# Patient Record
Sex: FEMALE | Race: WHITE | NOT HISPANIC OR LATINO
[De-identification: names, ages, dates, MRNs, and addresses within clinical notes are randomized per-mention and may not be internally consistent; named-entity substitution may affect disease eponyms.]

---

## 2017-06-25 ENCOUNTER — CHARTING TRANS (OUTPATIENT)
Dept: OTHER | Age: 32
End: 2017-06-25

## 2017-06-25 ENCOUNTER — LAB SERVICES (OUTPATIENT)
Dept: OTHER | Age: 32
End: 2017-06-25

## 2017-06-25 LAB
APPEARANCE: NORMAL
BILIRUBIN: NORMAL
GLUCOSE U: NORMAL
KETONES: NORMAL
LEUKOCYTES: NORMAL
NITRITE: NORMAL
OCCULT BLOOD: NORMAL
PH: 6
PROTEIN: NORMAL
URINE SPEC GRAVITY: 1015
UROBILINOGEN: 0.2

## 2017-07-02 LAB — BACTERIA UR CULT: NORMAL

## 2017-10-17 PROCEDURE — 82947 ASSAY GLUCOSE BLOOD QUANT: CPT | Performed by: INTERNAL MEDICINE

## 2017-10-17 PROCEDURE — 87491 CHLMYD TRACH DNA AMP PROBE: CPT | Performed by: INTERNAL MEDICINE

## 2017-10-17 PROCEDURE — 80074 ACUTE HEPATITIS PANEL: CPT | Performed by: INTERNAL MEDICINE

## 2017-10-17 PROCEDURE — 87591 N.GONORRHOEAE DNA AMP PROB: CPT | Performed by: INTERNAL MEDICINE

## 2017-10-17 PROCEDURE — 87389 HIV-1 AG W/HIV-1&-2 AB AG IA: CPT | Performed by: INTERNAL MEDICINE

## 2017-10-17 PROCEDURE — 86780 TREPONEMA PALLIDUM: CPT | Performed by: INTERNAL MEDICINE

## 2018-04-26 ENCOUNTER — HOSPITAL ENCOUNTER (OUTPATIENT)
Dept: MAMMOGRAPHY | Age: 33
Discharge: HOME | End: 2018-04-26
Attending: SPECIALIST
Payer: COMMERCIAL

## 2018-04-26 DIAGNOSIS — Z12.31 ENCOUNTER FOR SCREENING MAMMOGRAM FOR MALIGNANT NEOPLASM OF BREAST: ICD-10-CM

## 2018-04-26 PROCEDURE — 77067 SCR MAMMO BI INCL CAD: CPT | Performed by: SPECIALIST

## 2018-08-31 ENCOUNTER — CHARTING TRANS (OUTPATIENT)
Dept: OTHER | Age: 33
End: 2018-08-31

## 2018-08-31 ENCOUNTER — LAB SERVICES (OUTPATIENT)
Dept: OTHER | Age: 33
End: 2018-08-31

## 2018-09-02 LAB — BACTERIA UR CULT: NORMAL

## 2018-11-03 VITALS
WEIGHT: 118 LBS | HEIGHT: 64 IN | DIASTOLIC BLOOD PRESSURE: 76 MMHG | HEART RATE: 80 BPM | TEMPERATURE: 98.3 F | SYSTOLIC BLOOD PRESSURE: 100 MMHG | BODY MASS INDEX: 20.14 KG/M2 | RESPIRATION RATE: 16 BRPM

## 2018-11-15 PROBLEM — F32.0 CURRENT MILD EPISODE OF MAJOR DEPRESSIVE DISORDER WITHOUT PRIOR EPISODE (HCC): Status: ACTIVE | Noted: 2018-11-15

## 2018-11-15 PROBLEM — F32.0 CURRENT MILD EPISODE OF MAJOR DEPRESSIVE DISORDER WITHOUT PRIOR EPISODE: Status: ACTIVE | Noted: 2018-11-15

## 2018-11-27 VITALS — BODY MASS INDEX: 21.46 KG/M2 | RESPIRATION RATE: 14 BRPM | HEART RATE: 72 BPM | WEIGHT: 125 LBS

## 2019-02-12 PROCEDURE — 87086 URINE CULTURE/COLONY COUNT: CPT | Performed by: INTERNAL MEDICINE

## 2021-03-10 ENCOUNTER — TELEPHONE (OUTPATIENT)
Dept: OBGYN CLINIC | Facility: CLINIC | Age: 36
End: 2021-03-10

## 2021-03-10 DIAGNOSIS — O36.80X0 PREGNANCY WITH UNCERTAIN FETAL VIABILITY, SINGLE OR UNSPECIFIED FETUS: Primary | ICD-10-CM

## 2021-03-10 NOTE — TELEPHONE ENCOUNTER
----- Message from Rockwell Kocher sent at 3/10/2021  5:19 PM CST -----  Regarding: FOB US order  NP FOB US March 22, need order

## 2021-03-22 ENCOUNTER — INITIAL PRENATAL (OUTPATIENT)
Dept: OBGYN CLINIC | Facility: CLINIC | Age: 36
End: 2021-03-22
Payer: COMMERCIAL

## 2021-03-22 ENCOUNTER — ULTRASOUND ENCOUNTER (OUTPATIENT)
Dept: OBGYN CLINIC | Facility: CLINIC | Age: 36
End: 2021-03-22
Payer: COMMERCIAL

## 2021-03-22 VITALS
HEART RATE: 72 BPM | HEIGHT: 63 IN | SYSTOLIC BLOOD PRESSURE: 128 MMHG | WEIGHT: 123 LBS | DIASTOLIC BLOOD PRESSURE: 74 MMHG | BODY MASS INDEX: 21.79 KG/M2

## 2021-03-22 DIAGNOSIS — O09.519 SUPERVISION OF HIGH-RISK PREGNANCY OF ELDERLY PRIMIGRAVIDA: Primary | ICD-10-CM

## 2021-03-22 DIAGNOSIS — Z36.9 ENCOUNTER FOR ANTENATAL SCREENING OF MOTHER: ICD-10-CM

## 2021-03-22 PROCEDURE — 81002 URINALYSIS NONAUTO W/O SCOPE: CPT | Performed by: OBSTETRICS & GYNECOLOGY

## 2021-03-22 PROCEDURE — 3078F DIAST BP <80 MM HG: CPT | Performed by: OBSTETRICS & GYNECOLOGY

## 2021-03-22 PROCEDURE — 3008F BODY MASS INDEX DOCD: CPT | Performed by: OBSTETRICS & GYNECOLOGY

## 2021-03-22 PROCEDURE — 3074F SYST BP LT 130 MM HG: CPT | Performed by: OBSTETRICS & GYNECOLOGY

## 2021-03-22 PROCEDURE — 87086 URINE CULTURE/COLONY COUNT: CPT | Performed by: OBSTETRICS & GYNECOLOGY

## 2021-03-22 PROCEDURE — 87491 CHLMYD TRACH DNA AMP PROBE: CPT | Performed by: OBSTETRICS & GYNECOLOGY

## 2021-03-22 PROCEDURE — 87591 N.GONORRHOEAE DNA AMP PROB: CPT | Performed by: OBSTETRICS & GYNECOLOGY

## 2021-03-22 RX ORDER — CHOLECALCIFEROL (VITAMIN D3) 25 MCG
1 TABLET,CHEWABLE ORAL DAILY
COMMUNITY

## 2021-03-22 NOTE — PROGRESS NOTES
Patient is here for her first OB appointment. Ultrasound today viable pregnancy and consistent with last menstrual.  Of January 25, 2021 giving a due date of 11/1/2021 at 8 weeks. Ultrasound today showed 8 weeks 2 days ROD of 10/30/2021.   Patient state

## 2021-03-23 LAB
C TRACH DNA SPEC QL NAA+PROBE: NEGATIVE
N GONORRHOEA DNA SPEC QL NAA+PROBE: NEGATIVE

## 2021-03-24 LAB
APPEARANCE: CLEAR
MULTISTIX LOT#: 1044 NUMERIC
PH, URINE: 7 (ref 4.5–8)
SPECIFIC GRAVITY: 1.01 (ref 1–1.03)
URINE-COLOR: YELLOW
UROBILINOGEN,SEMI-QN: 0.2 MG/DL (ref 0–1.9)

## 2021-03-30 ENCOUNTER — LAB ENCOUNTER (OUTPATIENT)
Dept: LAB | Facility: HOSPITAL | Age: 36
End: 2021-03-30
Attending: OBSTETRICS & GYNECOLOGY
Payer: COMMERCIAL

## 2021-03-30 DIAGNOSIS — Z36.9 ENCOUNTER FOR ANTENATAL SCREENING OF MOTHER: ICD-10-CM

## 2021-03-30 PROCEDURE — 86850 RBC ANTIBODY SCREEN: CPT

## 2021-03-30 PROCEDURE — 86901 BLOOD TYPING SEROLOGIC RH(D): CPT

## 2021-03-30 PROCEDURE — 87340 HEPATITIS B SURFACE AG IA: CPT

## 2021-03-30 PROCEDURE — 86900 BLOOD TYPING SEROLOGIC ABO: CPT

## 2021-03-30 PROCEDURE — 87389 HIV-1 AG W/HIV-1&-2 AB AG IA: CPT

## 2021-03-30 PROCEDURE — 86780 TREPONEMA PALLIDUM: CPT

## 2021-03-30 PROCEDURE — 86762 RUBELLA ANTIBODY: CPT

## 2021-03-30 PROCEDURE — 85025 COMPLETE CBC W/AUTO DIFF WBC: CPT

## 2021-03-30 PROCEDURE — 36415 COLL VENOUS BLD VENIPUNCTURE: CPT

## 2021-04-19 ENCOUNTER — ROUTINE PRENATAL (OUTPATIENT)
Dept: OBGYN CLINIC | Facility: CLINIC | Age: 36
End: 2021-04-19
Payer: COMMERCIAL

## 2021-04-19 VITALS
SYSTOLIC BLOOD PRESSURE: 104 MMHG | BODY MASS INDEX: 22.32 KG/M2 | DIASTOLIC BLOOD PRESSURE: 62 MMHG | HEIGHT: 63 IN | WEIGHT: 126 LBS

## 2021-04-19 DIAGNOSIS — Z34.01 ENCOUNTER FOR SUPERVISION OF NORMAL FIRST PREGNANCY IN FIRST TRIMESTER: Primary | ICD-10-CM

## 2021-04-19 PROCEDURE — 81002 URINALYSIS NONAUTO W/O SCOPE: CPT | Performed by: OBSTETRICS & GYNECOLOGY

## 2021-04-19 PROCEDURE — 3074F SYST BP LT 130 MM HG: CPT | Performed by: OBSTETRICS & GYNECOLOGY

## 2021-04-19 PROCEDURE — 3008F BODY MASS INDEX DOCD: CPT | Performed by: OBSTETRICS & GYNECOLOGY

## 2021-04-19 PROCEDURE — 3078F DIAST BP <80 MM HG: CPT | Performed by: OBSTETRICS & GYNECOLOGY

## 2021-04-27 ENCOUNTER — TELEPHONE (OUTPATIENT)
Dept: OBGYN CLINIC | Facility: CLINIC | Age: 36
End: 2021-04-27

## 2021-04-28 NOTE — TELEPHONE ENCOUNTER
Spoke with patient and she is aware of her [facility] NIPT results. Trisomy 13,18 and 21 are negative and she is aware of the predicted fetal sex. Results released. Understanding verbalized.

## 2021-05-04 ENCOUNTER — TELEPHONE (OUTPATIENT)
Dept: OBGYN CLINIC | Facility: CLINIC | Age: 36
End: 2021-05-04

## 2021-05-05 NOTE — TELEPHONE ENCOUNTER
Mark Horton MD  [facility] 5 minutes ago (11:46 AM)     It will be the patient's decision regarding partner testing    Message text

## 2021-05-05 NOTE — TELEPHONE ENCOUNTER
Patient advised of positive carrier screen. Understanding verbalized. Partner testing advised, pt states she is not sure who the baby's father is. Pt advised to review results and contact [facility] genetic counselors in the meantime.  will route to [facility] for fu

## 2021-05-05 NOTE — TELEPHONE ENCOUNTER
Patient states baby's father is being tested, he submitted request for saliva kit through [employer]. Patient instructed to provide our office with results once available. Patient verbalizes understanding.

## 2021-05-17 ENCOUNTER — ROUTINE PRENATAL (OUTPATIENT)
Dept: OBGYN CLINIC | Facility: CLINIC | Age: 36
End: 2021-05-17
Payer: COMMERCIAL

## 2021-05-17 VITALS
HEIGHT: 63 IN | DIASTOLIC BLOOD PRESSURE: 60 MMHG | BODY MASS INDEX: 22.86 KG/M2 | WEIGHT: 129 LBS | SYSTOLIC BLOOD PRESSURE: 90 MMHG

## 2021-05-17 DIAGNOSIS — Z36.89 ENCOUNTER FOR FETAL ANATOMIC SURVEY: ICD-10-CM

## 2021-05-17 DIAGNOSIS — Z34.02 ENCOUNTER FOR SUPERVISION OF NORMAL FIRST PREGNANCY IN SECOND TRIMESTER: Primary | ICD-10-CM

## 2021-05-17 PROCEDURE — 3074F SYST BP LT 130 MM HG: CPT | Performed by: OBSTETRICS & GYNECOLOGY

## 2021-05-17 PROCEDURE — 3078F DIAST BP <80 MM HG: CPT | Performed by: OBSTETRICS & GYNECOLOGY

## 2021-05-17 PROCEDURE — 3008F BODY MASS INDEX DOCD: CPT | Performed by: OBSTETRICS & GYNECOLOGY

## 2021-05-17 PROCEDURE — 81002 URINALYSIS NONAUTO W/O SCOPE: CPT | Performed by: OBSTETRICS & GYNECOLOGY

## 2021-05-17 NOTE — PROGRESS NOTES
Doing well. Has spell some movements. Declined alpha-fetoprotein for neural tube defect. OB ultrasound with next appointment.   Return to office in 4 weeks

## 2021-06-14 ENCOUNTER — ULTRASOUND ENCOUNTER (OUTPATIENT)
Dept: OBGYN CLINIC | Facility: CLINIC | Age: 36
End: 2021-06-14
Payer: COMMERCIAL

## 2021-06-14 ENCOUNTER — ROUTINE PRENATAL (OUTPATIENT)
Dept: OBGYN CLINIC | Facility: CLINIC | Age: 36
End: 2021-06-14
Payer: COMMERCIAL

## 2021-06-14 VITALS
BODY MASS INDEX: 24.27 KG/M2 | HEIGHT: 63 IN | DIASTOLIC BLOOD PRESSURE: 60 MMHG | WEIGHT: 137 LBS | SYSTOLIC BLOOD PRESSURE: 100 MMHG | HEART RATE: 81 BPM

## 2021-06-14 DIAGNOSIS — Z34.02 ENCOUNTER FOR SUPERVISION OF NORMAL FIRST PREGNANCY IN SECOND TRIMESTER: ICD-10-CM

## 2021-06-14 DIAGNOSIS — O09.519 SUPERVISION OF HIGH-RISK PREGNANCY OF ELDERLY PRIMIGRAVIDA: Primary | ICD-10-CM

## 2021-06-14 PROCEDURE — 3008F BODY MASS INDEX DOCD: CPT | Performed by: OBSTETRICS & GYNECOLOGY

## 2021-06-14 PROCEDURE — 81002 URINALYSIS NONAUTO W/O SCOPE: CPT | Performed by: OBSTETRICS & GYNECOLOGY

## 2021-06-14 PROCEDURE — 3078F DIAST BP <80 MM HG: CPT | Performed by: OBSTETRICS & GYNECOLOGY

## 2021-06-14 PROCEDURE — 3074F SYST BP LT 130 MM HG: CPT | Performed by: OBSTETRICS & GYNECOLOGY

## 2021-06-14 RX ORDER — VALACYCLOVIR HYDROCHLORIDE 1 G/1
TABLET, FILM COATED ORAL
COMMUNITY
End: 2021-07-12

## 2021-07-12 ENCOUNTER — ROUTINE PRENATAL (OUTPATIENT)
Dept: OBGYN CLINIC | Facility: CLINIC | Age: 36
End: 2021-07-12
Payer: COMMERCIAL

## 2021-07-12 VITALS
HEART RATE: 80 BPM | SYSTOLIC BLOOD PRESSURE: 90 MMHG | BODY MASS INDEX: 24.98 KG/M2 | DIASTOLIC BLOOD PRESSURE: 62 MMHG | WEIGHT: 141 LBS | HEIGHT: 63 IN

## 2021-07-12 DIAGNOSIS — Z36.9 ENCOUNTER FOR ANTENATAL SCREENING OF MOTHER: ICD-10-CM

## 2021-07-12 DIAGNOSIS — Z3A.24 24 WEEKS GESTATION OF PREGNANCY: Primary | ICD-10-CM

## 2021-07-12 LAB
GLUCOSE (URINE DIPSTICK): NEGATIVE MG/DL
MULTISTIX LOT#: NORMAL NUMERIC
PROTEIN (URINE DIPSTICK): NEGATIVE MG/DL

## 2021-07-12 PROCEDURE — 3074F SYST BP LT 130 MM HG: CPT | Performed by: OBSTETRICS & GYNECOLOGY

## 2021-07-12 PROCEDURE — 3008F BODY MASS INDEX DOCD: CPT | Performed by: OBSTETRICS & GYNECOLOGY

## 2021-07-12 PROCEDURE — 81002 URINALYSIS NONAUTO W/O SCOPE: CPT | Performed by: OBSTETRICS & GYNECOLOGY

## 2021-07-12 PROCEDURE — 3078F DIAST BP <80 MM HG: CPT | Performed by: OBSTETRICS & GYNECOLOGY

## 2021-07-19 ENCOUNTER — LAB ENCOUNTER (OUTPATIENT)
Dept: LAB | Age: 36
End: 2021-07-19
Attending: OBSTETRICS & GYNECOLOGY
Payer: COMMERCIAL

## 2021-07-19 DIAGNOSIS — Z36.9 ENCOUNTER FOR ANTENATAL SCREENING OF MOTHER: ICD-10-CM

## 2021-07-19 LAB
BASOPHILS # BLD AUTO: 0.03 X10(3) UL (ref 0–0.2)
BASOPHILS NFR BLD AUTO: 0.3 %
DEPRECATED RDW RBC AUTO: 45.5 FL (ref 35.1–46.3)
EOSINOPHIL # BLD AUTO: 0.33 X10(3) UL (ref 0–0.7)
EOSINOPHIL NFR BLD AUTO: 2.8 %
ERYTHROCYTE [DISTWIDTH] IN BLOOD BY AUTOMATED COUNT: 12.9 % (ref 11–15)
GLUCOSE 1H P GLC SERPL-MCNC: 118 MG/DL
HCT VFR BLD AUTO: 34.5 %
HGB BLD-MCNC: 11.1 G/DL
IMM GRANULOCYTES # BLD AUTO: 0.07 X10(3) UL (ref 0–1)
IMM GRANULOCYTES NFR BLD: 0.6 %
LYMPHOCYTES # BLD AUTO: 2.21 X10(3) UL (ref 1–4)
LYMPHOCYTES NFR BLD AUTO: 18.6 %
MCH RBC QN AUTO: 31.1 PG (ref 26–34)
MCHC RBC AUTO-ENTMCNC: 32.2 G/DL (ref 31–37)
MCV RBC AUTO: 96.6 FL
MONOCYTES # BLD AUTO: 0.84 X10(3) UL (ref 0.1–1)
MONOCYTES NFR BLD AUTO: 7.1 %
NEUTROPHILS # BLD AUTO: 8.41 X10 (3) UL (ref 1.5–7.7)
NEUTROPHILS # BLD AUTO: 8.41 X10(3) UL (ref 1.5–7.7)
NEUTROPHILS NFR BLD AUTO: 70.6 %
PLATELET # BLD AUTO: 309 10(3)UL (ref 150–450)
RBC # BLD AUTO: 3.57 X10(6)UL
WBC # BLD AUTO: 11.9 X10(3) UL (ref 4–11)

## 2021-07-19 PROCEDURE — 36415 COLL VENOUS BLD VENIPUNCTURE: CPT

## 2021-07-19 PROCEDURE — 82950 GLUCOSE TEST: CPT

## 2021-07-19 PROCEDURE — 85025 COMPLETE CBC W/AUTO DIFF WBC: CPT

## 2021-08-07 ENCOUNTER — TELEPHONE (OUTPATIENT)
Dept: OBGYN CLINIC | Facility: CLINIC | Age: 36
End: 2021-08-07

## 2021-08-07 NOTE — TELEPHONE ENCOUNTER
Patient states she's urinating a lot and once done she has mild tingling and slight burning. Please advise.   She has upcoming appointment Monday the 9th

## 2021-08-07 NOTE — TELEPHONE ENCOUNTER
Patient is 27 weeks pregnant, reporting frequent urination with slight burning. Denies fever. Patient offered appointment today but declined. She is schedule to be seen on Monday in our office. Patient instructed to go to urgent care if symptoms worse.  Mes

## 2021-08-09 ENCOUNTER — ROUTINE PRENATAL (OUTPATIENT)
Dept: OBGYN CLINIC | Facility: CLINIC | Age: 36
End: 2021-08-09
Payer: COMMERCIAL

## 2021-08-09 VITALS
HEIGHT: 63 IN | DIASTOLIC BLOOD PRESSURE: 70 MMHG | BODY MASS INDEX: 26.05 KG/M2 | SYSTOLIC BLOOD PRESSURE: 110 MMHG | WEIGHT: 147 LBS | HEART RATE: 88 BPM

## 2021-08-09 DIAGNOSIS — Z3A.28 28 WEEKS GESTATION OF PREGNANCY: Primary | ICD-10-CM

## 2021-08-09 DIAGNOSIS — R30.0 DYSURIA: ICD-10-CM

## 2021-08-09 DIAGNOSIS — Z36.9 ENCOUNTER FOR ANTENATAL SCREENING OF MOTHER: ICD-10-CM

## 2021-08-09 LAB
APPEARANCE: CLEAR
BILIRUBIN: NEGATIVE
GLUCOSE (URINE DIPSTICK): NEGATIVE MG/DL
GLUCOSE (URINE DIPSTICK): NEGATIVE MG/DL
KETONES (URINE DIPSTICK): NEGATIVE MG/DL
LEUKOCYTES: NEGATIVE
MULTISTIX LOT#: 5077 NUMERIC
MULTISTIX LOT#: NORMAL NUMERIC
NITRITE, URINE: NEGATIVE
OCCULT BLOOD: NEGATIVE
PH, URINE: 7 (ref 4.5–8)
PROTEIN (URINE DIPSTICK): NEGATIVE MG/DL
PROTEIN (URINE DIPSTICK): NEGATIVE MG/DL
SPECIFIC GRAVITY: 1.01 (ref 1–1.03)
URINE-COLOR: YELLOW
UROBILINOGEN,SEMI-QN: 0.2 MG/DL (ref 0–1.9)

## 2021-08-09 PROCEDURE — 3008F BODY MASS INDEX DOCD: CPT | Performed by: OBSTETRICS & GYNECOLOGY

## 2021-08-09 PROCEDURE — 87086 URINE CULTURE/COLONY COUNT: CPT | Performed by: OBSTETRICS & GYNECOLOGY

## 2021-08-09 PROCEDURE — 99212 OFFICE O/P EST SF 10 MIN: CPT | Performed by: OBSTETRICS & GYNECOLOGY

## 2021-08-09 PROCEDURE — 81002 URINALYSIS NONAUTO W/O SCOPE: CPT | Performed by: OBSTETRICS & GYNECOLOGY

## 2021-08-09 PROCEDURE — 3078F DIAST BP <80 MM HG: CPT | Performed by: OBSTETRICS & GYNECOLOGY

## 2021-08-09 PROCEDURE — 3074F SYST BP LT 130 MM HG: CPT | Performed by: OBSTETRICS & GYNECOLOGY

## 2021-08-09 RX ORDER — NITROFURANTOIN 25; 75 MG/1; MG/1
100 CAPSULE ORAL 2 TIMES DAILY
Qty: 14 CAPSULE | Refills: 0 | Status: SHIPPED | OUTPATIENT
Start: 2021-08-09 | End: 2021-08-16

## 2021-08-09 NOTE — PROGRESS NOTES
Fetus active. Taking prenatal vitamins. Having dysuria and frequency for 1 week  CHIEF COMPLAINT:   Patient presents with frequency and discomfort with urination and urgency for the past week.   HPI:   Nohemi Callejas is a 43year old  Patient's last mens

## 2021-08-12 NOTE — PROGRESS NOTES
Hi [facility],   Your urine culture results are normal. I encourage you to learn more about each test by accessing the great resources available through [address]  Click on the test name if you would like to see a description of the test. I hope this information

## 2021-08-23 ENCOUNTER — ROUTINE PRENATAL (OUTPATIENT)
Dept: OBGYN CLINIC | Facility: CLINIC | Age: 36
End: 2021-08-23
Payer: COMMERCIAL

## 2021-08-23 VITALS
WEIGHT: 150 LBS | HEART RATE: 80 BPM | HEIGHT: 63 IN | SYSTOLIC BLOOD PRESSURE: 100 MMHG | BODY MASS INDEX: 26.58 KG/M2 | DIASTOLIC BLOOD PRESSURE: 60 MMHG

## 2021-08-23 DIAGNOSIS — Z3A.29 29 WEEKS GESTATION OF PREGNANCY: Primary | ICD-10-CM

## 2021-08-23 PROCEDURE — 90471 IMMUNIZATION ADMIN: CPT | Performed by: OBSTETRICS & GYNECOLOGY

## 2021-08-23 PROCEDURE — 90715 TDAP VACCINE 7 YRS/> IM: CPT | Performed by: OBSTETRICS & GYNECOLOGY

## 2021-08-23 PROCEDURE — 3008F BODY MASS INDEX DOCD: CPT | Performed by: OBSTETRICS & GYNECOLOGY

## 2021-08-23 PROCEDURE — 3078F DIAST BP <80 MM HG: CPT | Performed by: OBSTETRICS & GYNECOLOGY

## 2021-08-23 PROCEDURE — 81002 URINALYSIS NONAUTO W/O SCOPE: CPT | Performed by: OBSTETRICS & GYNECOLOGY

## 2021-08-23 PROCEDURE — 3074F SYST BP LT 130 MM HG: CPT | Performed by: OBSTETRICS & GYNECOLOGY

## 2021-09-08 ENCOUNTER — ROUTINE PRENATAL (OUTPATIENT)
Dept: OBGYN CLINIC | Facility: CLINIC | Age: 36
End: 2021-09-08
Payer: COMMERCIAL

## 2021-09-08 VITALS
HEIGHT: 63 IN | DIASTOLIC BLOOD PRESSURE: 84 MMHG | WEIGHT: 156 LBS | HEART RATE: 74 BPM | BODY MASS INDEX: 27.64 KG/M2 | SYSTOLIC BLOOD PRESSURE: 127 MMHG

## 2021-09-08 DIAGNOSIS — Z3A.32 32 WEEKS GESTATION OF PREGNANCY: Primary | ICD-10-CM

## 2021-09-08 DIAGNOSIS — Z36.89 ENCOUNTER FOR ULTRASOUND TO ASSESS INTERVAL GROWTH OF FETUS: ICD-10-CM

## 2021-09-08 PROCEDURE — 81002 URINALYSIS NONAUTO W/O SCOPE: CPT | Performed by: OBSTETRICS & GYNECOLOGY

## 2021-09-08 PROCEDURE — 3079F DIAST BP 80-89 MM HG: CPT | Performed by: OBSTETRICS & GYNECOLOGY

## 2021-09-08 PROCEDURE — 3074F SYST BP LT 130 MM HG: CPT | Performed by: OBSTETRICS & GYNECOLOGY

## 2021-09-08 PROCEDURE — 3008F BODY MASS INDEX DOCD: CPT | Performed by: OBSTETRICS & GYNECOLOGY

## 2021-09-08 RX ORDER — FERROUS SULFATE 325(65) MG
TABLET ORAL
COMMUNITY
End: 2021-11-15

## 2021-09-08 NOTE — PROGRESS NOTES
Fetus active. Return to office in 2 weeks. Need to have disability paper filled out. Would like time off before delivery.   Return to office in 2 weeks

## 2021-09-17 ENCOUNTER — LABORATORY ENCOUNTER (OUTPATIENT)
Dept: LAB | Age: 36
End: 2021-09-17
Attending: OBSTETRICS & GYNECOLOGY
Payer: COMMERCIAL

## 2021-09-17 DIAGNOSIS — Z36.9 ENCOUNTER FOR ANTENATAL SCREENING OF MOTHER: ICD-10-CM

## 2021-09-17 DIAGNOSIS — R30.0 DYSURIA: ICD-10-CM

## 2021-09-17 LAB
BILIRUB UR QL STRIP.AUTO: NEGATIVE
COLOR UR AUTO: YELLOW
GLUCOSE UR STRIP.AUTO-MCNC: 150 MG/DL
KETONES UR STRIP.AUTO-MCNC: NEGATIVE MG/DL
LEUKOCYTE ESTERASE UR QL STRIP.AUTO: NEGATIVE
NITRITE UR QL STRIP.AUTO: NEGATIVE
PH UR STRIP.AUTO: 6 [PH] (ref 5–8)
PROT UR STRIP.AUTO-MCNC: NEGATIVE MG/DL
RBC UR QL AUTO: NEGATIVE
SP GR UR STRIP.AUTO: 1 (ref 1–1.03)
UROBILINOGEN UR STRIP.AUTO-MCNC: <2 MG/DL

## 2021-09-17 PROCEDURE — 36415 COLL VENOUS BLD VENIPUNCTURE: CPT

## 2021-09-17 PROCEDURE — 81003 URINALYSIS AUTO W/O SCOPE: CPT

## 2021-09-17 PROCEDURE — 87389 HIV-1 AG W/HIV-1&-2 AB AG IA: CPT

## 2021-09-20 ENCOUNTER — ULTRASOUND ENCOUNTER (OUTPATIENT)
Dept: OBGYN CLINIC | Facility: CLINIC | Age: 36
End: 2021-09-20
Payer: COMMERCIAL

## 2021-09-20 ENCOUNTER — ROUTINE PRENATAL (OUTPATIENT)
Dept: OBGYN CLINIC | Facility: CLINIC | Age: 36
End: 2021-09-20
Payer: COMMERCIAL

## 2021-09-20 VITALS
DIASTOLIC BLOOD PRESSURE: 70 MMHG | HEART RATE: 76 BPM | HEIGHT: 63 IN | WEIGHT: 157 LBS | BODY MASS INDEX: 27.82 KG/M2 | SYSTOLIC BLOOD PRESSURE: 104 MMHG

## 2021-09-20 DIAGNOSIS — O09.519 SUPERVISION OF HIGH-RISK PREGNANCY OF ELDERLY PRIMIGRAVIDA: Primary | ICD-10-CM

## 2021-09-20 DIAGNOSIS — Z23 NEED FOR VACCINATION: ICD-10-CM

## 2021-09-20 DIAGNOSIS — Z34.93 ENCOUNTER FOR SUPERVISION OF NORMAL PREGNANCY IN THIRD TRIMESTER, UNSPECIFIED GRAVIDITY: ICD-10-CM

## 2021-09-20 PROCEDURE — 3008F BODY MASS INDEX DOCD: CPT | Performed by: OBSTETRICS & GYNECOLOGY

## 2021-09-20 PROCEDURE — 81002 URINALYSIS NONAUTO W/O SCOPE: CPT | Performed by: OBSTETRICS & GYNECOLOGY

## 2021-09-20 PROCEDURE — 90471 IMMUNIZATION ADMIN: CPT | Performed by: OBSTETRICS & GYNECOLOGY

## 2021-09-20 PROCEDURE — 90686 IIV4 VACC NO PRSV 0.5 ML IM: CPT | Performed by: OBSTETRICS & GYNECOLOGY

## 2021-09-20 PROCEDURE — 3078F DIAST BP <80 MM HG: CPT | Performed by: OBSTETRICS & GYNECOLOGY

## 2021-09-20 PROCEDURE — 3074F SYST BP LT 130 MM HG: CPT | Performed by: OBSTETRICS & GYNECOLOGY

## 2021-09-20 NOTE — PROGRESS NOTES
Ultrasound done today showing normal growth. Flu shot given today. Fetus active. Will do NST with next appointment.  Return to office in 4 weeks, BRUNO

## 2021-09-22 ENCOUNTER — NURSE ONLY (OUTPATIENT)
Dept: LAB | Facility: HOSPITAL | Age: 36
End: 2021-09-22
Attending: OBSTETRICS & GYNECOLOGY
Payer: COMMERCIAL

## 2021-09-22 ENCOUNTER — TELEPHONE (OUTPATIENT)
Dept: OBGYN CLINIC | Facility: CLINIC | Age: 36
End: 2021-09-22

## 2021-09-22 DIAGNOSIS — R05.9 COUGH: ICD-10-CM

## 2021-09-22 DIAGNOSIS — R05.9 COUGH: Primary | ICD-10-CM

## 2021-09-22 NOTE — TELEPHONE ENCOUNTER
Spoke with patient, fever of 99.9 that started yesterday, started taking Tylenol, which is helping. Nasal congestion, runny nose, sore throat, cough. Denies shortness of breath. She is vaccinated, but worried about Covid and would like to be tested.  No pos

## 2021-09-23 ENCOUNTER — TELEPHONE (OUTPATIENT)
Dept: OBGYN CLINIC | Facility: CLINIC | Age: 36
End: 2021-09-23

## 2021-09-23 LAB — SARS-COV-2 RNA RESP QL NAA+PROBE: NOT DETECTED

## 2021-09-24 NOTE — TELEPHONE ENCOUNTER
Spoke with patient, gathered more information regarding FMLA paperwork. Paperwork completed and placed in provider box for signature. Patient verbalized understanding.

## 2021-09-29 ENCOUNTER — TELEPHONE (OUTPATIENT)
Dept: OBGYN CLINIC | Facility: CLINIC | Age: 36
End: 2021-09-29

## 2021-09-29 NOTE — TELEPHONE ENCOUNTER
Pt states Medical leave was denied due to a question. Pt would like to know if she should  bring in the forms to get revised or can she cross it out herself?

## 2021-10-01 NOTE — TELEPHONE ENCOUNTER
PT called but didn't want to leave a message and said she would try back for [facility]

## 2021-10-04 ENCOUNTER — HOSPITAL ENCOUNTER (OUTPATIENT)
Facility: HOSPITAL | Age: 36
Setting detail: OBSERVATION
Discharge: HOME | End: 2021-10-04
Attending: OBSTETRICS & GYNECOLOGY | Admitting: OBSTETRICS & GYNECOLOGY
Payer: COMMERCIAL

## 2021-10-04 ENCOUNTER — ROUTINE PRENATAL (OUTPATIENT)
Dept: OBGYN CLINIC | Facility: CLINIC | Age: 36
End: 2021-10-04
Payer: COMMERCIAL

## 2021-10-04 ENCOUNTER — APPOINTMENT (OUTPATIENT)
Dept: ULTRASOUND IMAGING | Facility: HOSPITAL | Age: 36
End: 2021-10-04
Attending: OBSTETRICS & GYNECOLOGY
Payer: COMMERCIAL

## 2021-10-04 VITALS
HEART RATE: 79 BPM | WEIGHT: 157 LBS | HEIGHT: 63 IN | SYSTOLIC BLOOD PRESSURE: 128 MMHG | DIASTOLIC BLOOD PRESSURE: 84 MMHG | BODY MASS INDEX: 27.82 KG/M2

## 2021-10-04 VITALS — SYSTOLIC BLOOD PRESSURE: 112 MMHG | DIASTOLIC BLOOD PRESSURE: 76 MMHG | HEART RATE: 75 BPM

## 2021-10-04 DIAGNOSIS — O36.8191 DECREASED FETAL MOVEMENT AFFECTING MANAGEMENT OF PREGNANCY, ANTEPARTUM, FETUS 1 OF MULTIPLE GESTATION: ICD-10-CM

## 2021-10-04 DIAGNOSIS — O09.519 SUPERVISION OF HIGH-RISK PREGNANCY OF ELDERLY PRIMIGRAVIDA: Primary | ICD-10-CM

## 2021-10-04 DIAGNOSIS — Z3A.36 36 WEEKS GESTATION OF PREGNANCY: ICD-10-CM

## 2021-10-04 PROBLEM — Z34.90 PREGNANCY: Status: ACTIVE | Noted: 2021-10-04

## 2021-10-04 PROBLEM — Z34.90 PREGNANCY (HCC): Status: ACTIVE | Noted: 2021-10-04

## 2021-10-04 PROCEDURE — 3079F DIAST BP 80-89 MM HG: CPT | Performed by: OBSTETRICS & GYNECOLOGY

## 2021-10-04 PROCEDURE — 76819 FETAL BIOPHYS PROFIL W/O NST: CPT | Performed by: OBSTETRICS & GYNECOLOGY

## 2021-10-04 PROCEDURE — 3074F SYST BP LT 130 MM HG: CPT | Performed by: OBSTETRICS & GYNECOLOGY

## 2021-10-04 PROCEDURE — 59025 FETAL NON-STRESS TEST: CPT | Performed by: OBSTETRICS & GYNECOLOGY

## 2021-10-04 PROCEDURE — 87653 STREP B DNA AMP PROBE: CPT | Performed by: OBSTETRICS & GYNECOLOGY

## 2021-10-04 PROCEDURE — 87081 CULTURE SCREEN ONLY: CPT | Performed by: OBSTETRICS & GYNECOLOGY

## 2021-10-04 PROCEDURE — 81002 URINALYSIS NONAUTO W/O SCOPE: CPT | Performed by: OBSTETRICS & GYNECOLOGY

## 2021-10-04 PROCEDURE — 3008F BODY MASS INDEX DOCD: CPT | Performed by: OBSTETRICS & GYNECOLOGY

## 2021-10-04 NOTE — PROGRESS NOTES
NST reactive. Decreased fetal movement. Patient states that over the past 1 week movements have been progressively decreasing. GBBS done today. To labor and delivery for another hour monitoring with biophysical profile kick count discussed.   Return to

## 2021-10-04 NOTE — PROGRESS NOTES
Pt admitted to triage rm 2 for decreased fm. Pt came over from [employer] office. Pt presents as G-1, P-0 w/edc 11/1/21. Pt accompanied by S/O. Pt denies srom, bleeding, pain or uc's. Abd soft,nontender. Pt states +fm noted since leaving OB office. Hx obtained.  PO

## 2021-10-05 NOTE — NST
Nonstress Test   Patient: Mac Ban    Gestation: 36w0d    NST:       Variability: Moderate           Accelerations: Yes           Decelerations: None            Baseline: 120 BPM                                                                Aco

## 2021-10-06 ENCOUNTER — ROUTINE PRENATAL (OUTPATIENT)
Dept: OBGYN CLINIC | Facility: CLINIC | Age: 36
End: 2021-10-06
Payer: COMMERCIAL

## 2021-10-06 VITALS
SYSTOLIC BLOOD PRESSURE: 126 MMHG | WEIGHT: 159.63 LBS | HEIGHT: 63 IN | BODY MASS INDEX: 28.29 KG/M2 | DIASTOLIC BLOOD PRESSURE: 78 MMHG

## 2021-10-06 DIAGNOSIS — O36.8190 DECREASED FETAL MOVEMENT AFFECTING MANAGEMENT OF PREGNANCY, ANTEPARTUM, SINGLE OR UNSPECIFIED FETUS: Primary | ICD-10-CM

## 2021-10-06 DIAGNOSIS — Z3A.36 36 WEEKS GESTATION OF PREGNANCY: ICD-10-CM

## 2021-10-06 PROCEDURE — 81002 URINALYSIS NONAUTO W/O SCOPE: CPT | Performed by: OBSTETRICS & GYNECOLOGY

## 2021-10-06 PROCEDURE — 3078F DIAST BP <80 MM HG: CPT | Performed by: OBSTETRICS & GYNECOLOGY

## 2021-10-06 PROCEDURE — 3074F SYST BP LT 130 MM HG: CPT | Performed by: OBSTETRICS & GYNECOLOGY

## 2021-10-06 PROCEDURE — 59025 FETAL NON-STRESS TEST: CPT | Performed by: OBSTETRICS & GYNECOLOGY

## 2021-10-06 PROCEDURE — 3008F BODY MASS INDEX DOCD: CPT | Performed by: OBSTETRICS & GYNECOLOGY

## 2021-10-06 NOTE — PROGRESS NOTES
Decrease FM 2 days ago with reactive NST and was sent to labor and delivery for prolonged monitoring and a biophysical profile. BPP was done with a score of 6 out of 8.   Patient was brought back for repeat NST which is very reassuring today with good acc

## 2021-10-11 ENCOUNTER — TELEPHONE (OUTPATIENT)
Dept: OBGYN CLINIC | Facility: CLINIC | Age: 36
End: 2021-10-11

## 2021-10-11 ENCOUNTER — ROUTINE PRENATAL (OUTPATIENT)
Dept: OBGYN CLINIC | Facility: CLINIC | Age: 36
End: 2021-10-11
Payer: COMMERCIAL

## 2021-10-11 VITALS
HEART RATE: 83 BPM | HEIGHT: 63 IN | BODY MASS INDEX: 28.53 KG/M2 | SYSTOLIC BLOOD PRESSURE: 127 MMHG | DIASTOLIC BLOOD PRESSURE: 83 MMHG | WEIGHT: 161 LBS

## 2021-10-11 DIAGNOSIS — Z3A.37 37 WEEKS GESTATION OF PREGNANCY: ICD-10-CM

## 2021-10-11 DIAGNOSIS — O09.523 AMA (ADVANCED MATERNAL AGE) MULTIGRAVIDA 35+, THIRD TRIMESTER: ICD-10-CM

## 2021-10-11 DIAGNOSIS — O09.519 SUPERVISION OF HIGH-RISK PREGNANCY OF ELDERLY PRIMIGRAVIDA: Primary | ICD-10-CM

## 2021-10-11 PROCEDURE — 3008F BODY MASS INDEX DOCD: CPT | Performed by: OBSTETRICS & GYNECOLOGY

## 2021-10-11 PROCEDURE — 3079F DIAST BP 80-89 MM HG: CPT | Performed by: OBSTETRICS & GYNECOLOGY

## 2021-10-11 PROCEDURE — 81002 URINALYSIS NONAUTO W/O SCOPE: CPT | Performed by: OBSTETRICS & GYNECOLOGY

## 2021-10-11 PROCEDURE — 3074F SYST BP LT 130 MM HG: CPT | Performed by: OBSTETRICS & GYNECOLOGY

## 2021-10-11 PROCEDURE — 59025 FETAL NON-STRESS TEST: CPT | Performed by: OBSTETRICS & GYNECOLOGY

## 2021-10-11 RX ORDER — PANTOPRAZOLE SODIUM 40 MG/1
40 TABLET, DELAYED RELEASE ORAL DAILY
Qty: 30 TABLET | Refills: 0 | Status: SHIPPED | OUTPATIENT
Start: 2021-10-11 | End: 2021-11-15

## 2021-10-11 NOTE — TELEPHONE ENCOUNTER
Spoke with patient, advised that she will go to the hospital on 10/30 at 8pm for Cytotec and be induced on 10/31/21. Patient verbalized understanding.

## 2021-10-11 NOTE — PROGRESS NOTES
Patient states that fetus is very active and movements are normal.  NST reactive today. Return to office in 1 week. Will do NST in labor and delivery next Monday. Having heartburn. Protonix 40 mg daily prescription called in.   No headaches or blurred

## 2021-10-11 NOTE — TELEPHONE ENCOUNTER
Spoke with L&D and scheduled patient for NST on 10/18/21 at 1430. Spoke with patient and notified her of date and time of NST. Dr. Kiley Salazar aware. Patient verbalized understanding.

## 2021-10-18 ENCOUNTER — HOSPITAL ENCOUNTER (OUTPATIENT)
Facility: HOSPITAL | Age: 36
Setting detail: OBSERVATION
Discharge: HOME | End: 2021-10-18
Attending: OBSTETRICS & GYNECOLOGY | Admitting: OBSTETRICS & GYNECOLOGY
Payer: COMMERCIAL

## 2021-10-18 ENCOUNTER — APPOINTMENT (OUTPATIENT)
Dept: OBGYN CLINIC | Facility: HOSPITAL | Age: 36
End: 2021-10-18
Payer: COMMERCIAL

## 2021-10-18 VITALS
TEMPERATURE: 97 F | RESPIRATION RATE: 20 BRPM | BODY MASS INDEX: 28.53 KG/M2 | SYSTOLIC BLOOD PRESSURE: 119 MMHG | HEART RATE: 89 BPM | HEIGHT: 63 IN | DIASTOLIC BLOOD PRESSURE: 87 MMHG | WEIGHT: 161 LBS

## 2021-10-18 PROCEDURE — 59025 FETAL NON-STRESS TEST: CPT | Performed by: OBSTETRICS & GYNECOLOGY

## 2021-10-18 NOTE — PROGRESS NOTES
Discharge instructions reviewed with pt. Pt verbalizes understanding. Denies questions. Pt leaves in stable condition per ambulation.

## 2021-10-18 NOTE — PROGRESS NOTES
, 38+0 weeks arrives per ambulation. Pt here for NST. PT taken to Triage 3 and changing at this time.

## 2021-10-18 NOTE — NST
Nonstress Test   Patient: Randy Infante    Gestation: 38w0d    NST:       Variability: Moderate           Accelerations: Yes           Decelerations: None            Baseline: 115 BPM           Uterine Irritability: No           Contractions: Regular

## 2021-10-18 NOTE — PROGRESS NOTES
EFMs applied, FHTs 115. Pt states she has been having mild ctxs. Denies LOF and vaginal bleeding. +FM. Pt denies any problems with pregnancy. Pt has anemia and depression. Pt states she stopped taking depression meds when she found out she was pregnant.  Pt None

## 2021-10-25 ENCOUNTER — ROUTINE PRENATAL (OUTPATIENT)
Dept: OBGYN CLINIC | Facility: CLINIC | Age: 36
End: 2021-10-25
Payer: COMMERCIAL

## 2021-10-25 VITALS
DIASTOLIC BLOOD PRESSURE: 85 MMHG | SYSTOLIC BLOOD PRESSURE: 124 MMHG | BODY MASS INDEX: 28.35 KG/M2 | HEIGHT: 63 IN | WEIGHT: 160 LBS | HEART RATE: 76 BPM

## 2021-10-25 DIAGNOSIS — Z34.03 ENCOUNTER FOR SUPERVISION OF NORMAL FIRST PREGNANCY IN THIRD TRIMESTER: ICD-10-CM

## 2021-10-25 DIAGNOSIS — O09.513 AMA (ADVANCED MATERNAL AGE) PRIMIGRAVIDA 35+, THIRD TRIMESTER: Primary | ICD-10-CM

## 2021-10-25 PROCEDURE — 3079F DIAST BP 80-89 MM HG: CPT | Performed by: OBSTETRICS & GYNECOLOGY

## 2021-10-25 PROCEDURE — 3074F SYST BP LT 130 MM HG: CPT | Performed by: OBSTETRICS & GYNECOLOGY

## 2021-10-25 PROCEDURE — 59025 FETAL NON-STRESS TEST: CPT | Performed by: OBSTETRICS & GYNECOLOGY

## 2021-10-25 PROCEDURE — 81002 URINALYSIS NONAUTO W/O SCOPE: CPT | Performed by: OBSTETRICS & GYNECOLOGY

## 2021-10-25 PROCEDURE — 3008F BODY MASS INDEX DOCD: CPT | Performed by: OBSTETRICS & GYNECOLOGY

## 2021-10-25 NOTE — PROGRESS NOTES
NST is reactive. No significant contractions. Cervix is closed 50% and -3. Scheduled for induction end of the week.   Return to office postpartum

## 2021-10-27 ENCOUNTER — TELEPHONE (OUTPATIENT)
Dept: OBGYN UNIT | Facility: HOSPITAL | Age: 36
End: 2021-10-27

## 2021-10-27 ENCOUNTER — LAB ENCOUNTER (OUTPATIENT)
Dept: LAB | Facility: HOSPITAL | Age: 36
End: 2021-10-27
Attending: OBSTETRICS & GYNECOLOGY
Payer: COMMERCIAL

## 2021-10-27 DIAGNOSIS — Z34.90 PREGNANCY: Primary | ICD-10-CM

## 2021-10-27 DIAGNOSIS — Z34.90 PREGNANCY: ICD-10-CM

## 2021-10-30 ENCOUNTER — HOSPITAL ENCOUNTER (INPATIENT)
Facility: HOSPITAL | Age: 36
LOS: 4 days | Discharge: HOME | End: 2021-11-03
Attending: OBSTETRICS & GYNECOLOGY | Admitting: OBSTETRICS & GYNECOLOGY
Payer: COMMERCIAL

## 2021-10-30 ENCOUNTER — APPOINTMENT (OUTPATIENT)
Dept: OBGYN CLINIC | Facility: HOSPITAL | Age: 36
End: 2021-10-30
Payer: COMMERCIAL

## 2021-10-30 PROCEDURE — 3E0P7VZ INTRODUCTION OF HORMONE INTO FEMALE REPRODUCTIVE, VIA NATURAL OR ARTIFICIAL OPENING: ICD-10-PCS | Performed by: OBSTETRICS & GYNECOLOGY

## 2021-10-30 RX ORDER — DEXTROSE, SODIUM CHLORIDE, SODIUM LACTATE, POTASSIUM CHLORIDE, AND CALCIUM CHLORIDE 5; .6; .31; .03; .02 G/100ML; G/100ML; G/100ML; G/100ML; G/100ML
INJECTION, SOLUTION INTRAVENOUS AS NEEDED
Status: DISCONTINUED | OUTPATIENT
Start: 2021-10-30 | End: 2021-10-31 | Stop reason: HOSPADM

## 2021-10-30 RX ORDER — IBUPROFEN 600 MG/1
600 TABLET ORAL EVERY 6 HOURS PRN
Status: DISCONTINUED | OUTPATIENT
Start: 2021-10-30 | End: 2021-10-31 | Stop reason: HOSPADM

## 2021-10-30 RX ORDER — TRISODIUM CITRATE DIHYDRATE AND CITRIC ACID MONOHYDRATE 500; 334 MG/5ML; MG/5ML
30 SOLUTION ORAL AS NEEDED
Status: COMPLETED | OUTPATIENT
Start: 2021-10-30 | End: 2021-10-31

## 2021-10-30 RX ORDER — ZOLPIDEM TARTRATE 5 MG/1
5 TABLET ORAL NIGHTLY PRN
Status: DISCONTINUED | OUTPATIENT
Start: 2021-10-30 | End: 2021-10-31

## 2021-10-30 RX ORDER — SODIUM CHLORIDE, SODIUM LACTATE, POTASSIUM CHLORIDE, CALCIUM CHLORIDE 600; 310; 30; 20 MG/100ML; MG/100ML; MG/100ML; MG/100ML
INJECTION, SOLUTION INTRAVENOUS CONTINUOUS
Status: DISCONTINUED | OUTPATIENT
Start: 2021-10-30 | End: 2021-10-31 | Stop reason: HOSPADM

## 2021-10-30 RX ORDER — ACETAMINOPHEN 500 MG
500 TABLET ORAL EVERY 6 HOURS PRN
Status: DISCONTINUED | OUTPATIENT
Start: 2021-10-30 | End: 2021-10-31 | Stop reason: HOSPADM

## 2021-10-30 RX ORDER — ONDANSETRON 2 MG/ML
4 INJECTION INTRAMUSCULAR; INTRAVENOUS EVERY 6 HOURS PRN
Status: DISCONTINUED | OUTPATIENT
Start: 2021-10-30 | End: 2021-10-31 | Stop reason: HOSPADM

## 2021-10-30 RX ORDER — TERBUTALINE SULFATE 1 MG/ML
0.25 INJECTION, SOLUTION SUBCUTANEOUS AS NEEDED
Status: DISCONTINUED | OUTPATIENT
Start: 2021-10-30 | End: 2021-10-31 | Stop reason: HOSPADM

## 2021-10-30 RX ORDER — HYDROMORPHONE HYDROCHLORIDE 1 MG/ML
1 INJECTION, SOLUTION INTRAMUSCULAR; INTRAVENOUS; SUBCUTANEOUS EVERY 2 HOUR PRN
Status: DISCONTINUED | OUTPATIENT
Start: 2021-10-30 | End: 2021-10-31

## 2021-10-30 RX ORDER — AMMONIA INHALANTS 0.04 G/.3ML
0.3 INHALANT RESPIRATORY (INHALATION) AS NEEDED
Status: DISCONTINUED | OUTPATIENT
Start: 2021-10-30 | End: 2021-10-31 | Stop reason: HOSPADM

## 2021-10-31 ENCOUNTER — ANESTHESIA EVENT (OUTPATIENT)
Dept: OBGYN UNIT | Facility: HOSPITAL | Age: 36
End: 2021-10-31
Payer: COMMERCIAL

## 2021-10-31 ENCOUNTER — ANESTHESIA (OUTPATIENT)
Dept: OBGYN UNIT | Facility: HOSPITAL | Age: 36
End: 2021-10-31
Payer: COMMERCIAL

## 2021-10-31 PROCEDURE — 59510 CESAREAN DELIVERY: CPT | Performed by: OBSTETRICS & GYNECOLOGY

## 2021-10-31 PROCEDURE — 3E033VJ INTRODUCTION OF OTHER HORMONE INTO PERIPHERAL VEIN, PERCUTANEOUS APPROACH: ICD-10-PCS | Performed by: OBSTETRICS & GYNECOLOGY

## 2021-10-31 PROCEDURE — 59514 CESAREAN DELIVERY ONLY: CPT | Performed by: OBSTETRICS & GYNECOLOGY

## 2021-10-31 RX ORDER — GABAPENTIN 300 MG/1
300 CAPSULE ORAL EVERY 8 HOURS PRN
Status: DISCONTINUED | OUTPATIENT
Start: 2021-10-31 | End: 2021-11-03

## 2021-10-31 RX ORDER — DEXAMETHASONE SODIUM PHOSPHATE 4 MG/ML
VIAL (ML) INJECTION AS NEEDED
Status: DISCONTINUED | OUTPATIENT
Start: 2021-10-31 | End: 2021-10-31 | Stop reason: SURG

## 2021-10-31 RX ORDER — HYDROMORPHONE HYDROCHLORIDE 2 MG/1
2 TABLET ORAL EVERY 4 HOURS PRN
Status: DISCONTINUED | OUTPATIENT
Start: 2021-10-31 | End: 2021-11-03

## 2021-10-31 RX ORDER — DOCUSATE SODIUM 100 MG/1
100 CAPSULE, LIQUID FILLED ORAL
Status: DISCONTINUED | OUTPATIENT
Start: 2021-10-31 | End: 2021-11-03

## 2021-10-31 RX ORDER — IBUPROFEN 600 MG/1
600 TABLET ORAL EVERY 6 HOURS
Status: DISCONTINUED | OUTPATIENT
Start: 2021-11-01 | End: 2021-11-03

## 2021-10-31 RX ORDER — DIPHENHYDRAMINE HYDROCHLORIDE 50 MG/ML
25 INJECTION INTRAMUSCULAR; INTRAVENOUS ONCE AS NEEDED
Status: DISCONTINUED | OUTPATIENT
Start: 2021-10-31 | End: 2021-10-31 | Stop reason: HOSPADM

## 2021-10-31 RX ORDER — CEFAZOLIN SODIUM/WATER 2 G/20 ML
SYRINGE (ML) INTRAVENOUS
Status: DISCONTINUED
Start: 2021-10-31 | End: 2021-10-31 | Stop reason: WASHOUT

## 2021-10-31 RX ORDER — SODIUM CHLORIDE, SODIUM LACTATE, POTASSIUM CHLORIDE, CALCIUM CHLORIDE 600; 310; 30; 20 MG/100ML; MG/100ML; MG/100ML; MG/100ML
INJECTION, SOLUTION INTRAVENOUS CONTINUOUS
Status: DISCONTINUED | OUTPATIENT
Start: 2021-10-31 | End: 2021-11-03

## 2021-10-31 RX ORDER — LIDOCAINE HYDROCHLORIDE AND EPINEPHRINE 20; 5 MG/ML; UG/ML
INJECTION, SOLUTION EPIDURAL; INFILTRATION; INTRACAUDAL; PERINEURAL AS NEEDED
Status: DISCONTINUED | OUTPATIENT
Start: 2021-10-31 | End: 2021-10-31 | Stop reason: SURG

## 2021-10-31 RX ORDER — ONDANSETRON 2 MG/ML
4 INJECTION INTRAMUSCULAR; INTRAVENOUS EVERY 6 HOURS PRN
Status: DISCONTINUED | OUTPATIENT
Start: 2021-10-31 | End: 2021-11-03

## 2021-10-31 RX ORDER — BISACODYL 10 MG
10 SUPPOSITORY, RECTAL RECTAL ONCE AS NEEDED
Status: DISCONTINUED | OUTPATIENT
Start: 2021-10-31 | End: 2021-11-03

## 2021-10-31 RX ORDER — ONDANSETRON 2 MG/ML
4 INJECTION INTRAMUSCULAR; INTRAVENOUS ONCE AS NEEDED
Status: DISCONTINUED | OUTPATIENT
Start: 2021-10-31 | End: 2021-10-31 | Stop reason: HOSPADM

## 2021-10-31 RX ORDER — EPHEDRINE SULFATE 50 MG/ML
INJECTION INTRAVENOUS AS NEEDED
Status: DISCONTINUED | OUTPATIENT
Start: 2021-10-31 | End: 2021-10-31 | Stop reason: SURG

## 2021-10-31 RX ORDER — ACETAMINOPHEN 500 MG
1000 TABLET ORAL EVERY 6 HOURS
Status: DISCONTINUED | OUTPATIENT
Start: 2021-10-31 | End: 2021-11-03

## 2021-10-31 RX ORDER — BUPIVACAINE HCL/0.9 % NACL/PF 0.25 %
5 PLASTIC BAG, INJECTION (ML) EPIDURAL AS NEEDED
Status: DISCONTINUED | OUTPATIENT
Start: 2021-10-31 | End: 2021-11-01

## 2021-10-31 RX ORDER — DIPHENHYDRAMINE HCL 25 MG
25 CAPSULE ORAL EVERY 4 HOURS PRN
Status: DISCONTINUED | OUTPATIENT
Start: 2021-10-31 | End: 2021-11-03

## 2021-10-31 RX ORDER — NALBUPHINE HCL 10 MG/ML
2.5 AMPUL (ML) INJECTION
Status: DISCONTINUED | OUTPATIENT
Start: 2021-10-31 | End: 2021-11-03

## 2021-10-31 RX ORDER — NALBUPHINE HCL 10 MG/ML
2.5 AMPUL (ML) INJECTION EVERY 4 HOURS PRN
Status: DISCONTINUED | OUTPATIENT
Start: 2021-10-31 | End: 2021-11-03

## 2021-10-31 RX ORDER — SIMETHICONE 80 MG
80 TABLET,CHEWABLE ORAL 3 TIMES DAILY PRN
Status: DISCONTINUED | OUTPATIENT
Start: 2021-10-31 | End: 2021-11-03

## 2021-10-31 RX ORDER — DIPHENHYDRAMINE HYDROCHLORIDE 50 MG/ML
12.5 INJECTION INTRAMUSCULAR; INTRAVENOUS EVERY 4 HOURS PRN
Status: DISCONTINUED | OUTPATIENT
Start: 2021-10-31 | End: 2021-11-03

## 2021-10-31 RX ORDER — MORPHINE SULFATE 2 MG/ML
INJECTION, SOLUTION INTRAMUSCULAR; INTRAVENOUS AS NEEDED
Status: DISCONTINUED | OUTPATIENT
Start: 2021-10-31 | End: 2021-10-31 | Stop reason: SURG

## 2021-10-31 RX ORDER — NALOXONE HYDROCHLORIDE 0.4 MG/ML
0.08 INJECTION, SOLUTION INTRAMUSCULAR; INTRAVENOUS; SUBCUTANEOUS
Status: ACTIVE | OUTPATIENT
Start: 2021-10-31 | End: 2021-11-01

## 2021-10-31 RX ORDER — NALBUPHINE HCL 10 MG/ML
2.5 AMPUL (ML) INJECTION
Status: DISCONTINUED | OUTPATIENT
Start: 2021-10-31 | End: 2021-11-01

## 2021-10-31 RX ORDER — HYDROMORPHONE HYDROCHLORIDE 1 MG/ML
0.2 INJECTION, SOLUTION INTRAMUSCULAR; INTRAVENOUS; SUBCUTANEOUS EVERY 2 HOUR PRN
Status: DISCONTINUED | OUTPATIENT
Start: 2021-10-31 | End: 2021-11-03

## 2021-10-31 RX ORDER — KETOROLAC TROMETHAMINE 30 MG/ML
INJECTION, SOLUTION INTRAMUSCULAR; INTRAVENOUS
Status: COMPLETED
Start: 2021-10-31 | End: 2021-10-31

## 2021-10-31 RX ORDER — CEFAZOLIN SODIUM/WATER 2 G/20 ML
SYRINGE (ML) INTRAVENOUS
Status: DISPENSED
Start: 2021-10-31 | End: 2021-10-31

## 2021-10-31 RX ORDER — KETOROLAC TROMETHAMINE 30 MG/ML
30 INJECTION, SOLUTION INTRAMUSCULAR; INTRAVENOUS ONCE AS NEEDED
Status: COMPLETED | OUTPATIENT
Start: 2021-10-31 | End: 2021-10-31

## 2021-10-31 RX ORDER — PHENYLEPHRINE HCL 10 MG/ML
VIAL (ML) INJECTION AS NEEDED
Status: DISCONTINUED | OUTPATIENT
Start: 2021-10-31 | End: 2021-10-31 | Stop reason: SURG

## 2021-10-31 RX ORDER — KETOROLAC TROMETHAMINE 30 MG/ML
30 INJECTION, SOLUTION INTRAMUSCULAR; INTRAVENOUS EVERY 6 HOURS
Status: DISPENSED | OUTPATIENT
Start: 2021-10-31 | End: 2021-11-01

## 2021-10-31 RX ORDER — DEXTROSE, SODIUM CHLORIDE, SODIUM LACTATE, POTASSIUM CHLORIDE, AND CALCIUM CHLORIDE 5; .6; .31; .03; .02 G/100ML; G/100ML; G/100ML; G/100ML; G/100ML
INJECTION, SOLUTION INTRAVENOUS CONTINUOUS PRN
Status: DISCONTINUED | OUTPATIENT
Start: 2021-10-31 | End: 2021-11-03

## 2021-10-31 RX ADMIN — PHENYLEPHRINE HCL 50 MCG: 10 MG/ML VIAL (ML) INJECTION at 09:52:00

## 2021-10-31 RX ADMIN — PHENYLEPHRINE HCL 100 MCG: 10 MG/ML VIAL (ML) INJECTION at 10:25:00

## 2021-10-31 RX ADMIN — DEXAMETHASONE SODIUM PHOSPHATE 4 MG: 4 MG/ML VIAL (ML) INJECTION at 10:29:00

## 2021-10-31 RX ADMIN — SODIUM CHLORIDE, SODIUM LACTATE, POTASSIUM CHLORIDE, CALCIUM CHLORIDE: 600; 310; 30; 20 INJECTION, SOLUTION INTRAVENOUS at 10:36:00

## 2021-10-31 RX ADMIN — EPHEDRINE SULFATE 10 MG: 50 INJECTION INTRAVENOUS at 10:13:00

## 2021-10-31 RX ADMIN — SODIUM CHLORIDE, SODIUM LACTATE, POTASSIUM CHLORIDE, CALCIUM CHLORIDE: 600; 310; 30; 20 INJECTION, SOLUTION INTRAVENOUS at 10:35:00

## 2021-10-31 RX ADMIN — PHENYLEPHRINE HCL 50 MCG: 10 MG/ML VIAL (ML) INJECTION at 09:48:00

## 2021-10-31 RX ADMIN — SODIUM CHLORIDE, SODIUM LACTATE, POTASSIUM CHLORIDE, CALCIUM CHLORIDE: 600; 310; 30; 20 INJECTION, SOLUTION INTRAVENOUS at 09:59:00

## 2021-10-31 RX ADMIN — MORPHINE SULFATE 2 MG: 2 INJECTION, SOLUTION INTRAMUSCULAR; INTRAVENOUS at 09:59:00

## 2021-10-31 RX ADMIN — ONDANSETRON 4 MG: 2 INJECTION INTRAMUSCULAR; INTRAVENOUS at 09:45:00

## 2021-10-31 RX ADMIN — PHENYLEPHRINE HCL 100 MCG: 10 MG/ML VIAL (ML) INJECTION at 10:04:00

## 2021-10-31 RX ADMIN — LIDOCAINE HYDROCHLORIDE AND EPINEPHRINE 20 ML: 20; 5 INJECTION, SOLUTION EPIDURAL; INFILTRATION; INTRACAUDAL; PERINEURAL at 09:38:00

## 2021-10-31 RX ADMIN — PHENYLEPHRINE HCL 50 MCG: 10 MG/ML VIAL (ML) INJECTION at 09:43:00

## 2021-10-31 RX ADMIN — PHENYLEPHRINE HCL 100 MCG: 10 MG/ML VIAL (ML) INJECTION at 10:13:00

## 2021-10-31 NOTE — ANESTHESIA PREPROCEDURE EVALUATION
PRE-OP EVALUATION    Patient Name: [place]    Admit Diagnosis: pregnant  Pregnancy    Pre-op Diagnosis: * No surgery found *        Anesthesia Procedure: LABOR ANALGESIA    * Surgery not found *    Pre-op vitals reviewed.   Temp: 97.9 °F (36.6 ° tablet, Rfl: 0  Ferrous Sulfate Dried (FEOSOL) 200 (65 Fe) MG Oral Tab, Take by mouth., Disp: , Rfl:   prenatal multivitamin plus DHA 27-0.8-228 MG Oral Cap, Take 1 capsule by mouth daily. , Disp: , Rfl:         Allergies: Neosporin Wound Cleanser, Neospori normal.                 Other findings            ASA: 2 and emergent  Plan: epidural  NPO status verified and     Post-procedure pain management plan discussed with surgeon and patient.       Plan/risks discussed with: patient                Present on Adm

## 2021-10-31 NOTE — OPERATIVE REPORT
[facility]   Section - Operative Note    Amilcar Troy Patient Status:  Inpatient    1985 MRN [id number]   Location [address] [facility] Attending Eleazar Rey MD   Hosp Day # 1 PCP None Pcp       Preoperative Diagnosi to the parents and placed in the radiant warmer. The placenta was delivered intact with a 3 vessel cord. Uterus, tubes and ovaries were normal in appearance. The uterine cavity was swept clean using a wet lap.   The cervix was dilated with a ring forcep

## 2021-10-31 NOTE — PROGRESS NOTES
Report given to Solis alanis RN on MB. Patient transferred in stable condition via cart to MB room 2218. ID bands ,atched at bedside.

## 2021-10-31 NOTE — PROGRESS NOTES
At 5118 RN was at bedside following late decelerations. RN palpated abdomen for approximately 3 minutes. Abdomen remained rigid entire time with pt in 10/10 pain. RN noted additionally bleeding on pad.  Dr. Abhinav Sinclair was called to bedside and arrived at 0801 to

## 2021-10-31 NOTE — H&P
[facility]:  [facility]    1985 MRN [id number]   Location [address] [facility] Attending Ernestine Major MD   Hosp Day # 1 PCP None Pcp     Subjective:   IOL for AMA.  Cytotec judy

## 2021-10-31 NOTE — PROGRESS NOTES
Called by RN. Decel to 90s. SVE 3 cm/-2/-3.  Due to multiple episodes of decel and lack of descent will proceed with C/S

## 2021-10-31 NOTE — PROGRESS NOTES
Pt is a 43year old female admitted to 110/110-A. Patient presents with:  Scheduled Induction     Pt is  39w5d intra-uterine pregnancy. History obtained, consents signed. Oriented to room, staff, and plan of care.

## 2021-10-31 NOTE — ANESTHESIA POSTPROCEDURE EVALUATION
[address] [facility] Patient Status:  Inpatient   Age/Gender 43year old female MRN [id number]   Location [address] [place] [address] Attending Jaswinder Burks MD   Hosp Day # 1 PCP None Pcp       Anesthesia Post-op Note     SECT

## 2021-11-01 NOTE — PROGRESS NOTES
Anesthesia Pain Service    POD# 1 s/p      Block type: EPIDURAL            Post op review:dOING WELL PAIN UNDER CONTROL.  dENIES SIDE EFFECTS

## 2021-11-01 NOTE — PROGRESS NOTES
POD#1    Pt has no complaints, lochia min   11/01/21  1016   BP: 106/60   Pulse: 102   Resp: 18   Temp: 97.9 °F (36.6 °C)     Recent Labs   Lab 10/30/21  1923 11/01/21  0940   RBC 4.14 3.12*   HGB 12.6 9.6*   HCT 37.5 29.5*   MCV 90.6 94.6   MCH 30.4 30.8

## 2021-11-02 PROBLEM — Z98.891 STATUS POST PRIMARY LOW TRANSVERSE CESAREAN SECTION: Status: ACTIVE | Noted: 2021-10-31

## 2021-11-02 PROBLEM — D62 ACUTE BLOOD LOSS ANEMIA: Status: ACTIVE | Noted: 2021-11-02

## 2021-11-02 PROBLEM — Z98.890 STATUS POST INDUCTION OF LABOR: Status: ACTIVE | Noted: 2021-11-02

## 2021-11-02 NOTE — PROGRESS NOTES
OB progress note    A/P: 43year old  now POD#2 s/p PLTCS at 39w6d for fetal intolerance labor after IOL for AMA, complicated by thick meconium, fetal decelerations, reached 3 cm.  Acute blood loss anemia    Afebrile, VSS  Pain controlled  Anemia - w

## 2021-11-03 VITALS
SYSTOLIC BLOOD PRESSURE: 111 MMHG | OXYGEN SATURATION: 100 % | TEMPERATURE: 98 F | HEIGHT: 63 IN | HEART RATE: 88 BPM | DIASTOLIC BLOOD PRESSURE: 69 MMHG | RESPIRATION RATE: 16 BRPM | BODY MASS INDEX: 28.35 KG/M2 | WEIGHT: 160 LBS

## 2021-11-03 RX ORDER — GABAPENTIN 300 MG/1
300 CAPSULE ORAL EVERY 8 HOURS PRN
Qty: 20 CAPSULE | Refills: 0 | Status: SHIPPED | OUTPATIENT
Start: 2021-11-03 | End: 2021-12-13

## 2021-11-03 RX ORDER — IBUPROFEN 600 MG/1
600 TABLET ORAL EVERY 6 HOURS
Qty: 40 TABLET | Refills: 0 | Status: SHIPPED | OUTPATIENT
Start: 2021-11-03

## 2021-11-03 NOTE — PROGRESS NOTES
Discharge instructions reviewed with patient and . Patient encouraged to ask questions and discharge paperwork provided. Teal band explained and given to patient. Patient encouraged to make follow up appointment with OB/GYN for 2 weeks postpartum.

## 2021-11-03 NOTE — PROGRESS NOTES
NURSING DISCHARGE NOTE    Patient escorted off mother baby unit by RN discharged home in stable condition.

## 2021-11-03 NOTE — PROGRESS NOTES
POD#3    Pt has no complaints, lochia min   11/03/21  0745   BP: 111/69   Pulse: 88   Resp: 16   Temp: 98.2 °F (36.8 °C)     Recent Labs   Lab 10/30/21  1923 11/01/21  0940   RBC 4.14 3.12*   HGB 12.6 9.6*   HCT 37.5 29.5*   MCV 90.6 94.6   MCH 30.4 30.8

## 2021-11-03 NOTE — DISCHARGE SUMMARY
Admittion Date:10/30/21  Discharge Date:11/3/21  [id number][place] 5/7 weeks, NRFHT  Procedure: 1LTCS  Surgeon: [facility] stay: Patient presented for IOL with cytotec, had repetitive prolong declarations, delivered baby boy by c/s, recovered without

## 2021-11-06 ENCOUNTER — TELEPHONE (OUTPATIENT)
Dept: OBGYN UNIT | Facility: HOSPITAL | Age: 36
End: 2021-11-06

## 2021-11-06 NOTE — PROGRESS NOTES
Reviewed self and infant care w / mom, she verbalizes understanding of instructions reviewed. Encourage to follow up w/ MDs as directed and w/ questions/concerns. Enc to join ct.

## 2021-11-15 ENCOUNTER — POSTPARTUM (OUTPATIENT)
Dept: OBGYN CLINIC | Facility: CLINIC | Age: 36
End: 2021-11-15
Payer: COMMERCIAL

## 2021-11-15 VITALS
HEIGHT: 63 IN | WEIGHT: 148.38 LBS | DIASTOLIC BLOOD PRESSURE: 71 MMHG | BODY MASS INDEX: 26.29 KG/M2 | SYSTOLIC BLOOD PRESSURE: 138 MMHG | HEART RATE: 83 BPM | TEMPERATURE: 98 F

## 2021-11-15 DIAGNOSIS — Z98.891 STATUS POST CESAREAN DELIVERY: Primary | ICD-10-CM

## 2021-11-15 PROCEDURE — 3008F BODY MASS INDEX DOCD: CPT | Performed by: OBSTETRICS & GYNECOLOGY

## 2021-11-15 PROCEDURE — 3075F SYST BP GE 130 - 139MM HG: CPT | Performed by: OBSTETRICS & GYNECOLOGY

## 2021-11-15 PROCEDURE — 3078F DIAST BP <80 MM HG: CPT | Performed by: OBSTETRICS & GYNECOLOGY

## 2021-11-15 NOTE — PROGRESS NOTES
Patient is status post 2 weeks after his  section. Steri-Strips removed. Incision dry and intact. Voiding well. Normal bowel movements. Breast-feeding. No chief complaint.   Follow-up 6 weeks postpartum check

## 2021-12-13 ENCOUNTER — POSTPARTUM (OUTPATIENT)
Dept: OBGYN CLINIC | Facility: CLINIC | Age: 36
End: 2021-12-13
Payer: COMMERCIAL

## 2021-12-13 VITALS
DIASTOLIC BLOOD PRESSURE: 82 MMHG | SYSTOLIC BLOOD PRESSURE: 127 MMHG | WEIGHT: 146 LBS | HEIGHT: 63 IN | BODY MASS INDEX: 25.87 KG/M2 | HEART RATE: 82 BPM

## 2021-12-13 PROBLEM — Z98.891 STATUS POST PRIMARY LOW TRANSVERSE CESAREAN SECTION: Status: RESOLVED | Noted: 2021-10-31 | Resolved: 2021-12-13

## 2021-12-13 PROBLEM — D62 ACUTE BLOOD LOSS ANEMIA: Status: RESOLVED | Noted: 2021-11-02 | Resolved: 2021-12-13

## 2021-12-13 PROBLEM — Z98.890 STATUS POST INDUCTION OF LABOR: Status: RESOLVED | Noted: 2021-11-02 | Resolved: 2021-12-13

## 2021-12-13 PROCEDURE — 3074F SYST BP LT 130 MM HG: CPT | Performed by: OBSTETRICS & GYNECOLOGY

## 2021-12-13 PROCEDURE — 3008F BODY MASS INDEX DOCD: CPT | Performed by: OBSTETRICS & GYNECOLOGY

## 2021-12-13 PROCEDURE — 3079F DIAST BP 80-89 MM HG: CPT | Performed by: OBSTETRICS & GYNECOLOGY

## 2021-12-13 NOTE — PROGRESS NOTES
Patient is a 43year old female here  for her post-partum exam. She had a C/S. She denies any complications, including urinary or bowel complaints. She reports no problems with her breasts. She denies any symptoms of post partum depression.     /82

## 2021-12-13 NOTE — PROGRESS NOTES
Pt here for 6 week post partum. She states she is doing well. She does not desire any birth control at this time.

## 2022-03-14 ENCOUNTER — OFFICE VISIT (OUTPATIENT)
Dept: OBGYN CLINIC | Facility: CLINIC | Age: 37
End: 2022-03-14
Payer: COMMERCIAL

## 2022-03-14 VITALS
HEIGHT: 63 IN | WEIGHT: 168 LBS | SYSTOLIC BLOOD PRESSURE: 107 MMHG | DIASTOLIC BLOOD PRESSURE: 76 MMHG | BODY MASS INDEX: 29.77 KG/M2 | HEART RATE: 77 BPM

## 2022-03-14 DIAGNOSIS — Z01.419 ENCOUNTER FOR ANNUAL ROUTINE GYNECOLOGICAL EXAMINATION: Primary | ICD-10-CM

## 2022-03-14 PROCEDURE — 99395 PREV VISIT EST AGE 18-39: CPT | Performed by: OBSTETRICS & GYNECOLOGY

## 2022-03-14 PROCEDURE — 87624 HPV HI-RISK TYP POOLED RSLT: CPT | Performed by: OBSTETRICS & GYNECOLOGY

## 2022-03-14 PROCEDURE — 3078F DIAST BP <80 MM HG: CPT | Performed by: OBSTETRICS & GYNECOLOGY

## 2022-03-14 PROCEDURE — 3074F SYST BP LT 130 MM HG: CPT | Performed by: OBSTETRICS & GYNECOLOGY

## 2022-03-14 PROCEDURE — 3008F BODY MASS INDEX DOCD: CPT | Performed by: OBSTETRICS & GYNECOLOGY

## 2022-03-14 PROCEDURE — 88175 CYTOPATH C/V AUTO FLUID REDO: CPT | Performed by: OBSTETRICS & GYNECOLOGY

## 2022-03-14 RX ORDER — RIZATRIPTAN BENZOATE 5 MG/1
5 TABLET ORAL AS NEEDED
Qty: 10 TABLET | Refills: 0 | Status: SHIPPED | OUTPATIENT
Start: 2022-03-14

## 2022-03-15 LAB — HPV I/H RISK 1 DNA SPEC QL NAA+PROBE: NEGATIVE

## 2022-06-20 RX ORDER — RIZATRIPTAN BENZOATE 5 MG/1
5 TABLET ORAL AS NEEDED
Qty: 10 TABLET | Refills: 0 | Status: SHIPPED | OUTPATIENT
Start: 2022-06-20

## 2022-06-21 RX ORDER — RIZATRIPTAN BENZOATE 5 MG/1
5 TABLET ORAL AS NEEDED
Qty: 10 TABLET | Refills: 0 | OUTPATIENT
Start: 2022-06-21

## 2022-07-21 ENCOUNTER — APPOINTMENT (OUTPATIENT)
Dept: URBAN - METROPOLITAN AREA CLINIC 242 | Age: 37
Setting detail: DERMATOLOGY
End: 2022-07-21

## 2022-07-21 DIAGNOSIS — D22 MELANOCYTIC NEVI: ICD-10-CM

## 2022-07-21 DIAGNOSIS — D18.0 HEMANGIOMA: ICD-10-CM

## 2022-07-21 PROBLEM — D22.62 MELANOCYTIC NEVI OF LEFT UPPER LIMB, INCLUDING SHOULDER: Status: ACTIVE | Noted: 2022-07-21

## 2022-07-21 PROBLEM — D22.5 MELANOCYTIC NEVI OF TRUNK: Status: ACTIVE | Noted: 2022-07-21

## 2022-07-21 PROBLEM — D22.71 MELANOCYTIC NEVI OF RIGHT LOWER LIMB, INCLUDING HIP: Status: ACTIVE | Noted: 2022-07-21

## 2022-07-21 PROBLEM — D22.61 MELANOCYTIC NEVI OF RIGHT UPPER LIMB, INCLUDING SHOULDER: Status: ACTIVE | Noted: 2022-07-21

## 2022-07-21 PROBLEM — D22.72 MELANOCYTIC NEVI OF LEFT LOWER LIMB, INCLUDING HIP: Status: ACTIVE | Noted: 2022-07-21

## 2022-07-21 PROBLEM — D18.01 HEMANGIOMA OF SKIN AND SUBCUTANEOUS TISSUE: Status: ACTIVE | Noted: 2022-07-21

## 2022-07-21 PROBLEM — D22.39 MELANOCYTIC NEVI OF OTHER PARTS OF FACE: Status: ACTIVE | Noted: 2022-07-21

## 2022-07-21 PROCEDURE — 99213 OFFICE O/P EST LOW 20 MIN: CPT | Mod: 25

## 2022-07-21 PROCEDURE — OTHER DIAGNOSIS COMMENT: OTHER

## 2022-07-21 PROCEDURE — 11104 PUNCH BX SKIN SINGLE LESION: CPT

## 2022-07-21 PROCEDURE — OTHER BIOPSY BY PUNCH METHOD: OTHER

## 2022-07-21 PROCEDURE — OTHER COUNSELING: OTHER

## 2022-07-21 ASSESSMENT — LOCATION DETAILED DESCRIPTION DERM
LOCATION DETAILED: LEFT ANTERIOR PROXIMAL THIGH
LOCATION DETAILED: RIGHT VENTRAL DISTAL FOREARM
LOCATION DETAILED: INFERIOR LUMBAR SPINE
LOCATION DETAILED: RIGHT PROXIMAL PRETIBIAL REGION
LOCATION DETAILED: RIGHT DISTAL CALF
LOCATION DETAILED: PERIUMBILICAL SKIN
LOCATION DETAILED: RIGHT PROXIMAL POSTERIOR UPPER ARM
LOCATION DETAILED: LEFT PROXIMAL PRETIBIAL REGION
LOCATION DETAILED: LEFT MID-UPPER BACK
LOCATION DETAILED: RIGHT INFERIOR UPPER BACK
LOCATION DETAILED: LEFT PROXIMAL DORSAL FOREARM
LOCATION DETAILED: RIGHT ANTECUBITAL SKIN
LOCATION DETAILED: RIGHT ANTERIOR DISTAL THIGH
LOCATION DETAILED: LEFT INFERIOR LATERAL MIDBACK
LOCATION DETAILED: RIGHT MID-UPPER BACK
LOCATION DETAILED: RIGHT DISTAL POSTERIOR THIGH
LOCATION DETAILED: LEFT MEDIAL BREAST 10-11:00 REGION
LOCATION DETAILED: RIGHT INFERIOR LATERAL MIDBACK
LOCATION DETAILED: LEFT DISTAL POSTERIOR THIGH
LOCATION DETAILED: RIGHT PROXIMAL RADIAL DORSAL FOREARM
LOCATION DETAILED: RIGHT SUPERIOR UPPER BACK
LOCATION DETAILED: LEFT KNEE
LOCATION DETAILED: RIGHT ANTERIOR PROXIMAL UPPER ARM
LOCATION DETAILED: LEFT POPLITEAL SKIN
LOCATION DETAILED: RIGHT DISTAL DORSAL FOREARM
LOCATION DETAILED: LEFT ANTERIOR DISTAL THIGH
LOCATION DETAILED: LEFT PROXIMAL CALF
LOCATION DETAILED: RIGHT PROXIMAL DORSAL FOREARM
LOCATION DETAILED: LEFT INFERIOR MEDIAL UPPER BACK
LOCATION DETAILED: LEFT SUPERIOR LATERAL UPPER BACK
LOCATION DETAILED: LEFT DISTAL POSTERIOR UPPER ARM
LOCATION DETAILED: EPIGASTRIC SKIN
LOCATION DETAILED: LEFT LATERAL SUPERIOR CHEST
LOCATION DETAILED: LEFT ELBOW
LOCATION DETAILED: LEFT ANTERIOR PROXIMAL UPPER ARM
LOCATION DETAILED: RIGHT DORSAL GREAT TOE
LOCATION DETAILED: LEFT VENTRAL PROXIMAL FOREARM
LOCATION DETAILED: RIGHT PROXIMAL POSTERIOR THIGH
LOCATION DETAILED: LEFT INFERIOR CENTRAL MALAR CHEEK
LOCATION DETAILED: RIGHT LATERAL GREAT TOE
LOCATION DETAILED: LEFT PROXIMAL POSTERIOR UPPER ARM
LOCATION DETAILED: LEFT PROXIMAL POSTERIOR THIGH
LOCATION DETAILED: LEFT ANTERIOR DISTAL UPPER ARM
LOCATION DETAILED: RIGHT MEDIAL SUPERIOR CHEST
LOCATION DETAILED: RIGHT ANTERIOR PROXIMAL THIGH

## 2022-07-21 ASSESSMENT — LOCATION SIMPLE DESCRIPTION DERM
LOCATION SIMPLE: RIGHT GREAT TOE
LOCATION SIMPLE: ABDOMEN
LOCATION SIMPLE: LEFT UPPER BACK
LOCATION SIMPLE: LEFT POSTERIOR THIGH
LOCATION SIMPLE: RIGHT CALF
LOCATION SIMPLE: LEFT POPLITEAL SKIN
LOCATION SIMPLE: LEFT CALF
LOCATION SIMPLE: CHEST
LOCATION SIMPLE: LEFT PRETIBIAL REGION
LOCATION SIMPLE: LEFT UPPER ARM
LOCATION SIMPLE: RIGHT THIGH
LOCATION SIMPLE: LEFT KNEE
LOCATION SIMPLE: RIGHT UPPER ARM
LOCATION SIMPLE: LEFT THIGH
LOCATION SIMPLE: RIGHT POSTERIOR UPPER ARM
LOCATION SIMPLE: RIGHT FOREARM
LOCATION SIMPLE: LEFT ELBOW
LOCATION SIMPLE: RIGHT POSTERIOR THIGH
LOCATION SIMPLE: LEFT LOWER BACK
LOCATION SIMPLE: RIGHT PRETIBIAL REGION
LOCATION SIMPLE: LEFT CHEEK
LOCATION SIMPLE: RIGHT UPPER BACK
LOCATION SIMPLE: RIGHT LOWER BACK
LOCATION SIMPLE: LEFT FOREARM
LOCATION SIMPLE: LEFT BREAST
LOCATION SIMPLE: LEFT POSTERIOR UPPER ARM
LOCATION SIMPLE: LOWER BACK

## 2022-07-21 ASSESSMENT — LOCATION ZONE DERM
LOCATION ZONE: FACE
LOCATION ZONE: LEG
LOCATION ZONE: TRUNK
LOCATION ZONE: TOE
LOCATION ZONE: ARM

## 2022-07-21 NOTE — PROCEDURE: DIAGNOSIS COMMENT
Render Risk Assessment In Note?: no
Detail Level: Simple
Comment: 3x4mm, discussed monitor for now vs surgical removal.

## 2022-07-21 NOTE — PROCEDURE: BIOPSY BY PUNCH METHOD
Notification Instructions: Patient will be notified of biopsy results. However, patient instructed to call the office if not contacted within 2 weeks.
Render Path Notes In Note?: No
Additional Anesthesia Volume In Cc (Will Not Render If 0): 0
Billing Type: Third-Party Bill
Consent: Written consent was obtained and risks were reviewed including but not limited to scarring, infection, bleeding, scabbing, incomplete removal, nerve damage and allergy to anesthesia.
Home Suture Removal Text: Patient was provided a home suture removal kit and will remove their sutures at home.  If they have any questions or difficulties they will call the office.
Body Location Override (Optional - Billing Will Still Be Based On Selected Body Map Location If Applicable): Right dorsal big toe
Dressing: bandage
Epidermal Sutures: 4-0 Ethilon
Hemostasis: None
Anesthesia Volume In Cc (Will Not Render If 0): 0.5
Biopsy Type: H and E
Suture Removal: 14 days
Punch Size In Mm: 4
Detail Level: Simple
Anesthesia Type: 1% lidocaine with epinephrine
Validate Note Data (See Information Below): Yes
Information: Selecting Yes will display possible errors in your note based on the variables you have selected. This validation is only offered as a suggestion for you. PLEASE NOTE THAT THE VALIDATION TEXT WILL BE REMOVED WHEN YOU FINALIZE YOUR NOTE. IF YOU WANT TO FAX A PRELIMINARY NOTE YOU WILL NEED TO TOGGLE THIS TO 'NO' IF YOU DO NOT WANT IT IN YOUR FAXED NOTE.
Post-Care Instructions: I reviewed with the patient in detail post-care instructions. Patient is to keep the biopsy site dry overnight, and then apply bacitracin twice daily until healed. Patient may apply hydrogen peroxide soaks to remove any crusting.
Wound Care: Petrolatum

## 2022-08-04 ENCOUNTER — APPOINTMENT (OUTPATIENT)
Dept: URBAN - METROPOLITAN AREA CLINIC 242 | Age: 37
Setting detail: DERMATOLOGY
End: 2022-08-04

## 2022-08-04 DIAGNOSIS — D22 MELANOCYTIC NEVI: ICD-10-CM

## 2022-08-04 PROBLEM — D22.71 MELANOCYTIC NEVI OF RIGHT LOWER LIMB, INCLUDING HIP: Status: ACTIVE | Noted: 2022-08-04

## 2022-08-04 PROCEDURE — 99213 OFFICE O/P EST LOW 20 MIN: CPT

## 2022-08-04 PROCEDURE — OTHER SUTURE REMOVAL (NO GLOBAL PERIOD): OTHER

## 2022-08-04 PROCEDURE — OTHER COUNSELING: OTHER

## 2022-08-04 ASSESSMENT — LOCATION SIMPLE DESCRIPTION DERM: LOCATION SIMPLE: RIGHT GREAT TOE

## 2022-08-04 ASSESSMENT — LOCATION ZONE DERM: LOCATION ZONE: TOE

## 2022-08-04 ASSESSMENT — LOCATION DETAILED DESCRIPTION DERM: LOCATION DETAILED: RIGHT DORSAL GREAT TOE

## 2022-08-04 NOTE — PROCEDURE: SUTURE REMOVAL (NO GLOBAL PERIOD)
Detail Level: Simple
Body Location Override (Optional - Billing Will Still Be Based On Selected Body Map Location If Applicable): Right dorsal big toe

## 2022-10-13 LAB
AMB EXT CHOLESTEROL, TOTAL: 208 MG/DL
AMB EXT GLUCOSE: 94 MG/DL
AMB EXT HDL CHOLESTEROL: 64 MG/DL
AMB EXT LDL CHOLESTEROL, DIRECT: 124 MG/DL
AMB EXT TRIGLYCERIDES: 111 MG/DL
AMB EXT WBC: 8.1 X10(3)UL

## 2022-11-07 ENCOUNTER — OFFICE VISIT (OUTPATIENT)
Dept: FAMILY MEDICINE CLINIC | Facility: CLINIC | Age: 37
End: 2022-11-07
Payer: COMMERCIAL

## 2022-11-07 VITALS
BODY MASS INDEX: 24.68 KG/M2 | RESPIRATION RATE: 14 BRPM | SYSTOLIC BLOOD PRESSURE: 98 MMHG | TEMPERATURE: 98 F | WEIGHT: 142.81 LBS | HEART RATE: 88 BPM | HEIGHT: 63.75 IN | DIASTOLIC BLOOD PRESSURE: 60 MMHG

## 2022-11-07 DIAGNOSIS — Z30.011 ENCOUNTER FOR INITIAL PRESCRIPTION OF CONTRACEPTIVE PILLS: ICD-10-CM

## 2022-11-07 DIAGNOSIS — L98.9 SKIN LESION OF CHEST WALL: ICD-10-CM

## 2022-11-07 DIAGNOSIS — Z00.00 WELL WOMAN EXAM WITHOUT GYNECOLOGICAL EXAM: Primary | ICD-10-CM

## 2022-11-07 RX ORDER — ACETAMINOPHEN AND CODEINE PHOSPHATE 120; 12 MG/5ML; MG/5ML
0.35 SOLUTION ORAL DAILY
Qty: 84 TABLET | Refills: 3 | Status: SHIPPED | OUTPATIENT
Start: 2022-11-07

## 2022-12-05 RX ORDER — RIZATRIPTAN BENZOATE 5 MG/1
5 TABLET ORAL AS NEEDED
Qty: 30 TABLET | Refills: 1 | Status: SHIPPED | OUTPATIENT
Start: 2022-12-05

## 2022-12-12 ENCOUNTER — TELEPHONE (OUTPATIENT)
Dept: OBGYN CLINIC | Facility: CLINIC | Age: 37
End: 2022-12-12

## 2023-02-02 ENCOUNTER — OFFICE VISIT (OUTPATIENT)
Dept: FAMILY MEDICINE CLINIC | Facility: CLINIC | Age: 38
End: 2023-02-02
Payer: COMMERCIAL

## 2023-02-02 VITALS
TEMPERATURE: 97 F | SYSTOLIC BLOOD PRESSURE: 120 MMHG | HEIGHT: 63.75 IN | HEART RATE: 68 BPM | OXYGEN SATURATION: 98 % | RESPIRATION RATE: 16 BRPM | DIASTOLIC BLOOD PRESSURE: 78 MMHG | WEIGHT: 139 LBS | BODY MASS INDEX: 24.02 KG/M2

## 2023-02-02 DIAGNOSIS — R30.0 DYSURIA: ICD-10-CM

## 2023-02-02 DIAGNOSIS — N89.8 VAGINAL DISCHARGE: Primary | ICD-10-CM

## 2023-02-02 DIAGNOSIS — Z30.015 ENCOUNTER FOR INITIAL PRESCRIPTION OF VAGINAL RING HORMONAL CONTRACEPTIVE: ICD-10-CM

## 2023-02-02 DIAGNOSIS — R23.4 CHANGE OF SKIN OF BREAST: ICD-10-CM

## 2023-02-02 LAB
APPEARANCE: CLEAR
BILIRUBIN: NEGATIVE
GLUCOSE (URINE DIPSTICK): NEGATIVE MG/DL
KETONES (URINE DIPSTICK): NEGATIVE MG/DL
LEUKOCYTES: NEGATIVE
MULTISTIX LOT#: ABNORMAL NUMERIC
NITRITE, URINE: NEGATIVE
PH, URINE: 6 (ref 4.5–8)
PROTEIN (URINE DIPSTICK): NEGATIVE MG/DL
SPECIFIC GRAVITY: 1 (ref 1–1.03)
URINE-COLOR: YELLOW
UROBILINOGEN,SEMI-QN: 0.2 MG/DL (ref 0–1.9)

## 2023-02-02 PROCEDURE — 87660 TRICHOMONAS VAGIN DIR PROBE: CPT | Performed by: FAMILY MEDICINE

## 2023-02-02 PROCEDURE — 87480 CANDIDA DNA DIR PROBE: CPT | Performed by: FAMILY MEDICINE

## 2023-02-02 PROCEDURE — 3008F BODY MASS INDEX DOCD: CPT | Performed by: FAMILY MEDICINE

## 2023-02-02 PROCEDURE — 87491 CHLMYD TRACH DNA AMP PROBE: CPT | Performed by: FAMILY MEDICINE

## 2023-02-02 PROCEDURE — 99214 OFFICE O/P EST MOD 30 MIN: CPT | Performed by: FAMILY MEDICINE

## 2023-02-02 PROCEDURE — 3078F DIAST BP <80 MM HG: CPT | Performed by: FAMILY MEDICINE

## 2023-02-02 PROCEDURE — 81003 URINALYSIS AUTO W/O SCOPE: CPT | Performed by: FAMILY MEDICINE

## 2023-02-02 PROCEDURE — 3074F SYST BP LT 130 MM HG: CPT | Performed by: FAMILY MEDICINE

## 2023-02-02 PROCEDURE — 87510 GARDNER VAG DNA DIR PROBE: CPT | Performed by: FAMILY MEDICINE

## 2023-02-02 PROCEDURE — 87591 N.GONORRHOEAE DNA AMP PROB: CPT | Performed by: FAMILY MEDICINE

## 2023-02-02 PROCEDURE — 87086 URINE CULTURE/COLONY COUNT: CPT | Performed by: FAMILY MEDICINE

## 2023-02-02 RX ORDER — ETONOGESTREL AND ETHINYL ESTRADIOL 11.7; 2.7 MG/1; MG/1
INSERT, EXTENDED RELEASE VAGINAL
Qty: 3 RING | Refills: 3 | Status: SHIPPED | OUTPATIENT
Start: 2023-02-02

## 2023-02-03 LAB
C TRACH DNA SPEC QL NAA+PROBE: NEGATIVE
N GONORRHOEA DNA SPEC QL NAA+PROBE: NEGATIVE

## 2023-05-26 ENCOUNTER — ORDER TRANSCRIPTION (OUTPATIENT)
Dept: ADMINISTRATIVE | Facility: HOSPITAL | Age: 38
End: 2023-05-26

## 2023-05-26 DIAGNOSIS — Z12.31 ENCOUNTER FOR SCREENING MAMMOGRAM FOR MALIGNANT NEOPLASM OF BREAST: Primary | ICD-10-CM

## 2023-05-27 ENCOUNTER — HOSPITAL ENCOUNTER (OUTPATIENT)
Dept: MAMMOGRAPHY | Age: 38
Discharge: HOME | End: 2023-05-27
Attending: FAMILY MEDICINE
Payer: COMMERCIAL

## 2023-05-27 DIAGNOSIS — Z12.31 ENCOUNTER FOR SCREENING MAMMOGRAM FOR MALIGNANT NEOPLASM OF BREAST: ICD-10-CM

## 2023-05-27 PROCEDURE — 77067 SCR MAMMO BI INCL CAD: CPT | Performed by: FAMILY MEDICINE

## 2023-06-13 ENCOUNTER — APPOINTMENT (OUTPATIENT)
Dept: MAMMOGRAPHY | Facility: HOSPITAL | Age: 38
End: 2023-06-13
Attending: FAMILY MEDICINE
Payer: COMMERCIAL

## 2023-06-13 ENCOUNTER — HOSPITAL ENCOUNTER (OUTPATIENT)
Dept: MAMMOGRAPHY | Facility: HOSPITAL | Age: 38
Discharge: HOME | End: 2023-06-13
Attending: FAMILY MEDICINE
Payer: COMMERCIAL

## 2023-06-13 ENCOUNTER — TELEPHONE (OUTPATIENT)
Dept: FAMILY MEDICINE CLINIC | Facility: CLINIC | Age: 38
End: 2023-06-13

## 2023-06-13 DIAGNOSIS — R92.2 INCONCLUSIVE MAMMOGRAM: ICD-10-CM

## 2023-06-13 DIAGNOSIS — N63.10 MASS OF RIGHT BREAST, UNSPECIFIED QUADRANT: Primary | ICD-10-CM

## 2023-06-13 PROCEDURE — 76641 ULTRASOUND BREAST COMPLETE: CPT | Performed by: FAMILY MEDICINE

## 2023-06-13 PROCEDURE — 77061 BREAST TOMOSYNTHESIS UNI: CPT | Performed by: FAMILY MEDICINE

## 2023-06-13 PROCEDURE — 77065 DX MAMMO INCL CAD UNI: CPT | Performed by: FAMILY MEDICINE

## 2023-06-13 NOTE — TELEPHONE ENCOUNTER
Radiologist reports pt with 7 mm nodule that's solid and lobulated in right breast    He recommends US guided right breast bx    Routed to Dr. Dallas Larry

## 2023-06-13 NOTE — TELEPHONE ENCOUNTER
Radiologist wants to speak to the Doctor or nurse in regards to the results of mammogram call is being transferred to triage

## 2023-06-23 ENCOUNTER — HOSPITAL ENCOUNTER (OUTPATIENT)
Dept: MAMMOGRAPHY | Facility: HOSPITAL | Age: 38
Discharge: HOME | End: 2023-06-23
Attending: FAMILY MEDICINE
Payer: COMMERCIAL

## 2023-06-23 DIAGNOSIS — R92.8 ABNORMAL MAMMOGRAM OF RIGHT BREAST: ICD-10-CM

## 2023-06-23 DIAGNOSIS — N63.10 MASS OF RIGHT BREAST, UNSPECIFIED QUADRANT: ICD-10-CM

## 2023-06-23 PROCEDURE — 88305 TISSUE EXAM BY PATHOLOGIST: CPT | Performed by: FAMILY MEDICINE

## 2023-06-23 PROCEDURE — 19083 BX BREAST 1ST LESION US IMAG: CPT | Performed by: FAMILY MEDICINE

## 2023-06-23 PROCEDURE — 77065 DX MAMMO INCL CAD UNI: CPT | Performed by: FAMILY MEDICINE

## 2023-09-13 ENCOUNTER — PATIENT MESSAGE (OUTPATIENT)
Dept: FAMILY MEDICINE CLINIC | Facility: CLINIC | Age: 38
End: 2023-09-13

## 2023-09-28 ENCOUNTER — OFFICE VISIT (OUTPATIENT)
Dept: FAMILY MEDICINE CLINIC | Facility: CLINIC | Age: 38
End: 2023-09-28
Payer: COMMERCIAL

## 2023-09-28 VITALS
RESPIRATION RATE: 16 BRPM | SYSTOLIC BLOOD PRESSURE: 100 MMHG | OXYGEN SATURATION: 98 % | TEMPERATURE: 97 F | BODY MASS INDEX: 22.57 KG/M2 | WEIGHT: 127.38 LBS | HEIGHT: 63 IN | HEART RATE: 68 BPM | DIASTOLIC BLOOD PRESSURE: 60 MMHG

## 2023-09-28 DIAGNOSIS — M71.22 POPLITEAL CYST, LEFT: Primary | ICD-10-CM

## 2023-09-28 DIAGNOSIS — R55 PRE-SYNCOPE: ICD-10-CM

## 2023-09-28 PROCEDURE — 3078F DIAST BP <80 MM HG: CPT | Performed by: NURSE PRACTITIONER

## 2023-09-28 PROCEDURE — 99213 OFFICE O/P EST LOW 20 MIN: CPT | Performed by: NURSE PRACTITIONER

## 2023-09-28 PROCEDURE — 3074F SYST BP LT 130 MM HG: CPT | Performed by: NURSE PRACTITIONER

## 2023-09-28 PROCEDURE — 3008F BODY MASS INDEX DOCD: CPT | Performed by: NURSE PRACTITIONER

## 2023-09-29 ENCOUNTER — TELEPHONE (OUTPATIENT)
Dept: ORTHOPEDICS CLINIC | Facility: CLINIC | Age: 38
End: 2023-09-29

## 2023-09-29 DIAGNOSIS — Z01.89 ENCOUNTER FOR LOWER EXTREMITY COMPARISON IMAGING STUDY: ICD-10-CM

## 2023-09-29 DIAGNOSIS — M25.562 LEFT KNEE PAIN, UNSPECIFIED CHRONICITY: Primary | ICD-10-CM

## 2023-09-29 NOTE — TELEPHONE ENCOUNTER
Patient has an upcoming appt for LT KNEE Eastman's Cyst. At this time patient has not had any imaging done, please place RX accordingly. I have notified the patient to come in 20-30 min prior to appointment time to have the imaging done . Please forward to the  pool to schedule imaging. Thank you.       Future Appointments   Date Time Provider Gentry Castelani   10/13/2023  9:40 AM Maynard Goodell, MD [facility] [id number]   10/19/2023  1:00 PM Sandeep Hitchcock MD [facility]  [facility] James

## 2023-10-03 ENCOUNTER — HOSPITAL ENCOUNTER (OUTPATIENT)
Dept: GENERAL RADIOLOGY | Facility: HOSPITAL | Age: 38
Discharge: HOME | End: 2023-10-03
Attending: ORTHOPAEDIC SURGERY
Payer: COMMERCIAL

## 2023-10-03 DIAGNOSIS — M25.562 LEFT KNEE PAIN, UNSPECIFIED CHRONICITY: ICD-10-CM

## 2023-10-03 DIAGNOSIS — Z01.89 ENCOUNTER FOR LOWER EXTREMITY COMPARISON IMAGING STUDY: ICD-10-CM

## 2023-10-03 PROCEDURE — 73564 X-RAY EXAM KNEE 4 OR MORE: CPT | Performed by: ORTHOPAEDIC SURGERY

## 2023-10-03 PROCEDURE — 73562 X-RAY EXAM OF KNEE 3: CPT | Performed by: ORTHOPAEDIC SURGERY

## 2023-10-04 ENCOUNTER — OFFICE VISIT (OUTPATIENT)
Dept: ORTHOPEDICS CLINIC | Facility: CLINIC | Age: 38
End: 2023-10-04
Payer: COMMERCIAL

## 2023-10-04 VITALS — HEIGHT: 64 IN | WEIGHT: 127 LBS | BODY MASS INDEX: 21.68 KG/M2

## 2023-10-04 DIAGNOSIS — M79.89 SOFT TISSUE MASS: Primary | ICD-10-CM

## 2023-10-04 DIAGNOSIS — M25.862 CYST OF LEFT KNEE JOINT: ICD-10-CM

## 2023-10-04 PROCEDURE — 99204 OFFICE O/P NEW MOD 45 MIN: CPT | Performed by: ORTHOPAEDIC SURGERY

## 2023-10-04 PROCEDURE — 3008F BODY MASS INDEX DOCD: CPT | Performed by: ORTHOPAEDIC SURGERY

## 2023-10-12 ENCOUNTER — OFFICE VISIT (OUTPATIENT)
Facility: CLINIC | Age: 38
End: 2023-10-12
Payer: COMMERCIAL

## 2023-10-12 VITALS
HEIGHT: 64 IN | SYSTOLIC BLOOD PRESSURE: 110 MMHG | WEIGHT: 126.19 LBS | HEART RATE: 85 BPM | BODY MASS INDEX: 21.54 KG/M2 | DIASTOLIC BLOOD PRESSURE: 70 MMHG

## 2023-10-12 DIAGNOSIS — Z01.812 PRE-PROCEDURAL LABORATORY EXAMINATION: Primary | ICD-10-CM

## 2023-10-12 DIAGNOSIS — Z30.430 ENCOUNTER FOR INSERTION OF INTRAUTERINE CONTRACEPTIVE DEVICE (IUD): ICD-10-CM

## 2023-10-12 LAB
CONTROL LINE PRESENT WITH A CLEAR BACKGROUND (YES/NO): YES YES/NO
KIT LOT #: NORMAL NUMERIC
PREGNANCY TEST, URINE: NEGATIVE

## 2023-10-12 PROCEDURE — 3008F BODY MASS INDEX DOCD: CPT | Performed by: OBSTETRICS & GYNECOLOGY

## 2023-10-12 PROCEDURE — 81025 URINE PREGNANCY TEST: CPT | Performed by: OBSTETRICS & GYNECOLOGY

## 2023-10-12 PROCEDURE — 99214 OFFICE O/P EST MOD 30 MIN: CPT | Performed by: OBSTETRICS & GYNECOLOGY

## 2023-10-12 PROCEDURE — 3078F DIAST BP <80 MM HG: CPT | Performed by: OBSTETRICS & GYNECOLOGY

## 2023-10-12 PROCEDURE — 58300 INSERT INTRAUTERINE DEVICE: CPT | Performed by: OBSTETRICS & GYNECOLOGY

## 2023-10-12 PROCEDURE — 3074F SYST BP LT 130 MM HG: CPT | Performed by: OBSTETRICS & GYNECOLOGY

## 2023-10-12 RX ORDER — COPPER 313.4 MG/1
1 INTRAUTERINE DEVICE INTRAUTERINE ONCE
Status: COMPLETED | OUTPATIENT
Start: 2023-10-12 | End: 2023-10-12

## 2023-10-12 RX ADMIN — COPPER 1 DEVICE: 313.4 INTRAUTERINE DEVICE INTRAUTERINE at 10:56:00

## 2023-10-12 NOTE — PROGRESS NOTES
Noble Green is a 34year old female [id number] Patient's last menstrual period was 10/11/2023 (exact date). Patient presents with:  Contraception: Looking into getting the Paragard   . She is a progesterone only pill and did not like how it makes her feel. She is using the NuvaRing ring with regular periods. She has migraines but no visual changes. The only has migraines on her period. She wishes to try the ParaGard. She did not have diabetes when she was pregnant. Pap smears have always been normal before.     OBSTETRICS HISTORY:  OB History    Para Term  AB Living   1 1 1     1   SAB IAB Ectopic Multiple Live Births         0 1      # Outcome Date GA Lbr Karan/2nd Weight Sex Delivery Anes PTL Lv   1 Term 10/31/21 39w6d  6 lb 13.4 oz (3.1 kg) M Caesarean EPI N ENEIDA      Complications: Variable decelerations, Late decelerations, Fetal intolerance to labor       GYNE HISTORY:  Periods regular monthly      Sexual activity:   Yes      Partners:   Male        Hx Prior Abnormal Pap: No  Pap Date: 22  Pap Result Notes: neg/neg        MEDICAL HISTORY:  Past Medical History:   Diagnosis Date    Anemia     Depression     no meds at this time    Migraine without aura     Screening for genetic disease carrier status 2021    FH-related conditions carrier, 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 deficiency carrier       SURGICAL HISTORY:  Past Surgical History:   Procedure Laterality Date          [place] teeth removed         SOCIAL HISTORY:  Social History    Socioeconomic History      Marital status:       Spouse name: Not on file      Number of children: 1      Years of education: 16      Highest education level: Not on file    Occupational History      Not on file    Tobacco Use      Smoking status: Former        Years: 8        Types: Cigarettes        Quit date: 3/2/2014        Years since quittin.6      Smokeless tobacco: Never    Vaping Use      Vaping Use: Never used    Substance and Sexual Activity      Alcohol use: Not Currently        Alcohol/week: 4.0 standard drinks of alcohol        Types: 4 Shots of liquor per week      Drug use: Not Currently        Comment: last used in end of feb/march      Sexual activity: Yes        Partners: Male    Other Topics      Concerns:         Service: Not Asked        Blood Transfusions: Not Asked        Caffeine Concern: No        Occupational Exposure: Not Asked        Hobby Hazards: Not Asked        Sleep Concern: Not Asked        Stress Concern: Not Asked        Weight Concern: Not Asked        Special Diet: Not Asked        Back Care: Not Asked        Exercise: Yes          6 times weekly- HIIT        Bike Helmet: Not Asked        Seat Belt: Yes        Self-Exams: Yes    Social History Narrative      Not on file    Social Determinants of Health  Financial Resource Strain: Not on file  Food Insecurity: Not on file  Transportation Needs: Not on file  Physical Activity: Not on file  Stress: Not on file  Social Connections: Not on file  Housing Stability: Not on file    FAMILY HISTORY:  Family History   Problem Relation Age of Onset    Lipids Mother     Diabetes Father     Lipids Father     Asthma Brother     Stroke Maternal Grandmother     Cancer Neg     Heart Attack Neg        MEDICATIONS:    Current Outpatient Medications:     Rizatriptan Benzoate 5 MG Oral Tab, Take 1 tablet (5 mg total) by mouth as needed for Migraine. , Disp: 30 tablet, Rfl: 1    Etonogestrel-Ethinyl Estradiol 0.12-0.015 MG/24HR Vaginal Ring, Place 1 ring vaginally and keep in place for 3 weeks, then remove for 1 week., Disp: 3 ring., Rfl: 3    ALLERGIES:    Benzalkonium Chlori*    HIVES, ITCHING, PAIN, SWELLING  Neosporin Wound Patrick*    HIVES, ITCHING, PAIN, SWELLING  Neosporin [Bacitrac*    HIVES  Dander                  ITCHING      Review of Systems:  Constitutional:  Denies fatigue,  Gastrointestinal:  denies heartburn, abdominal pain, diarrhea or constipation  Genitourinary:  denies dysuria, incontinence, abnormal vaginal discharge, vaginal itching    Neurological:  denies headaches, extremity weakness or numbness. PHYSICAL EXAM:   Constitutional: well developed, well nourished  Head/Face: normocephalic  Skin/Hair: no unusual rashes or bruises   Extremities: no edema, no cyanosis  Psychiatric:  Oriented to time, place, person and situation. Appropriate mood and affect    Pelvic Exam:  External Genitalia: normal appearance, hair distribution, and no lesions  Urethral Meatus:  normal in size, location, without lesions and prolapse  Bladder:  No fullness, masses or tenderness  Vagina:  Normal appearance without lesions, no abnormal discharge  Cervix:  Normal without tenderness on motion lesions. Uterus: normal in size, contour, position, mobility, without tenderness midplane. Adnexa: normal without masses or tenderness  Perineum: normal  Anus: no hemorroids     Assessment & Plan:  There are no diagnoses linked to this encounter. Contraceptive surveillance copper IUD.

## 2023-10-12 NOTE — PROGRESS NOTES
IUD Insertion     Pregnancy Results: negative from urine test   Birth control method(s) used:   LMP:10/11    Consent signed. Procedure discussed with the patient in detail including indication, risks, benefits, alternatives and complications. Procedure:  Speculum placed in the vagina. Betadine wash of vagina and cervix. Single tooth tenaculum was placed at the 12 o'clock position. Paragard IUD was placed without difficulty. Strings cut at 3 cm. Single tooth tenaculum removed. Patient tolerated procedure well. Visit Plan:  IUD surveillance was discussed with the patient.

## 2023-11-02 ENCOUNTER — TELEPHONE (OUTPATIENT)
Dept: FAMILY MEDICINE CLINIC | Facility: CLINIC | Age: 38
End: 2023-11-02

## 2023-11-02 NOTE — TELEPHONE ENCOUNTER
Outside labs reviewed. Iron is slightly elevated. If taking a supplement, can discontinue or decrease. Total cholesterol is mildly elevated at 218 with LDL of 121. Otherwise labs look good. Continue working on [employer] and exercise.

## 2023-11-07 NOTE — TELEPHONE ENCOUNTER
Sometimes iron can be elevated as a response to inflammation or infection. Nothing to worry about at this time.   We can consider rechecking with next set of labs

## 2023-11-07 NOTE — TELEPHONE ENCOUNTER
Called and talked to capri told her what Dr Kat Caicedo had to say about elevated iron levels being caused by inflammation or infection.

## 2023-11-09 ENCOUNTER — OFFICE VISIT (OUTPATIENT)
Facility: CLINIC | Age: 38
End: 2023-11-09
Payer: COMMERCIAL

## 2023-11-09 VITALS
SYSTOLIC BLOOD PRESSURE: 114 MMHG | WEIGHT: 124 LBS | HEIGHT: 64 IN | BODY MASS INDEX: 21.17 KG/M2 | DIASTOLIC BLOOD PRESSURE: 62 MMHG | HEART RATE: 76 BPM

## 2023-11-09 DIAGNOSIS — Z30.431 IUD CHECK UP: Primary | ICD-10-CM

## 2023-11-09 PROCEDURE — 99212 OFFICE O/P EST SF 10 MIN: CPT

## 2023-11-09 PROCEDURE — 3078F DIAST BP <80 MM HG: CPT

## 2023-11-09 PROCEDURE — 3074F SYST BP LT 130 MM HG: CPT

## 2023-11-09 PROCEDURE — 3008F BODY MASS INDEX DOCD: CPT

## 2023-11-09 NOTE — PROGRESS NOTES
Osiel Mcnulty is a 34year old female [id number] Patient's last menstrual period was 2023 (exact date). Chief Complaint   Patient presents with    IUD     Paragard f/u   .     OBSTETRICS HISTORY:  OB History    Para Term  AB Living   1 1 1     1   SAB IAB Ectopic Multiple Live Births         0 1      # Outcome Date GA Lbr Karan/2nd Weight Sex Delivery Anes PTL Lv   1 Term 10/31/21 39w6d  6 lb 13.4 oz (3.1 kg) M Caesarean EPI N ENEIDA      Complications: Variable decelerations, Late decelerations, Fetal intolerance to labor       GYNE HISTORY:     History   Sexual Activity    Sexual activity: Yes    Partners: Male    Birth control/ protection: Paragard     Comment: paragard 10/12/23                 MEDICAL HISTORY:  Past Medical History:   Diagnosis Date    Anemia     Depression     no meds at this time    Migraine without aura     Screening for genetic disease carrier status 2021    FH-related conditions carrier, 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 deficiency carrier       SURGICAL HISTORY:  Past Surgical History:   Procedure Laterality Date          [place] teeth removed         SOCIAL HISTORY:  Social History     Socioeconomic History    Marital status:      Spouse name: Not on file    Number of children: 1    Years of education: 16    Highest education level: Not on file   Occupational History    Not on file   Tobacco Use    Smoking status: Former     Years: 8     Types: Cigarettes     Quit date: 3/2/2014     Years since quittin.6    Smokeless tobacco: Never   Vaping Use    Vaping Use: Never used   Substance and Sexual Activity    Alcohol use: Not Currently     Alcohol/week: 4.0 standard drinks of alcohol     Types: 4 Shots of liquor per week    Drug use: Not Currently     Comment: last used in end of feb/march    Sexual activity: Yes     Partners: Male     Birth control/protection: Paragard     Comment: paragard 10/12/23   Other Topics Concern  Service Not Asked    Blood Transfusions Not Asked    Caffeine Concern No    Occupational Exposure Not Asked    Hobby Hazards Not Asked    Sleep Concern Not Asked    Stress Concern Not Asked    Weight Concern Not Asked    Special Diet Not Asked    Back Care Not Asked    Exercise Yes     Comment: 6 times weekly- HIIT    Bike Helmet Not Asked    Seat Belt Yes    Self-Exams Yes   Social History Narrative    Not on file     Social Determinants of Health     Financial Resource Strain: Not on file   Food Insecurity: Not on file   Transportation Needs: Not on file   Physical Activity: Not on file   Stress: Not on file   Social Connections: Not on file   Housing Stability: Not on file       FAMILY HISTORY:  Family History   Problem Relation Age of Onset    Lipids Mother     Diabetes Father     Lipids Father     Asthma Brother     Stroke Maternal Grandmother     Cancer Neg     Heart Attack Neg        MEDICATIONS:    Current Outpatient Medications:     Rizatriptan Benzoate 5 MG Oral Tab, Take 1 tablet (5 mg total) by mouth as needed for Migraine. , Disp: 30 tablet, Rfl: 1    ALLERGIES:    Allergies   Allergen Reactions    Benzalkonium Chloride HIVES, ITCHING, PAIN and SWELLING    Neosporin Wound Cleanser HIVES, ITCHING, PAIN and SWELLING    Neosporin [Bacitracin-Polymyxin B] HIVES    Dander ITCHING         PHYSICAL EXAM:   Pelvic Exam:  External Genitalia: normal appearance, hair distribution, and no lesions  Urethral Meatus:  normal in size, location, without lesions and prolapse  Bladder:  No fullness, masses or tenderness  Vagina:  Normal appearance without lesions, no abnormal discharge  Cervix:  Normal without tenderness on motion, IUD strings seen   Uterus: normal in size, contour, position, mobility, without tenderness  Adnexa: normal without masses or tenderness  Perineum: normal  Anus: no hemorroids     Assessment & Plan:  1.  IUD check up

## 2024-01-15 ENCOUNTER — TELEPHONE (OUTPATIENT)
Dept: FAMILY MEDICINE CLINIC | Facility: CLINIC | Age: 39
End: 2024-01-15

## 2024-01-15 DIAGNOSIS — Z11.1 SCREENING-PULMONARY TB: ICD-10-CM

## 2024-01-15 DIAGNOSIS — Z01.84 IMMUNITY STATUS TESTING: Primary | ICD-10-CM

## 2024-01-15 NOTE — TELEPHONE ENCOUNTER
Chart reviewed    No documentation of recent PPD  No MMR documentation  Tdap given 8/23/2021 - UTD    Form not yet received    Left message on home number for patient to call back.

## 2024-01-15 NOTE — TELEPHONE ENCOUNTER
Patient called that she is needing forms completed. Said she is going to drop them off this morning.   It says   Medical report on an adult at a  facility.     Patient said on paper they are needing     Tuberculin  Immunization vaccine required    Tdap and MMR     Told patient you would call her if she is needing to do titers blood test.

## 2024-01-16 NOTE — TELEPHONE ENCOUNTER
Patient dropped off form to be completed. Please call once completed [phone]. Form in triage

## 2024-01-17 NOTE — TELEPHONE ENCOUNTER
Called LMOM that she needs to get labs done and needs a physical she can get the labs done at any [facility], no need to fast for this.

## 2024-01-27 ENCOUNTER — LAB ENCOUNTER (OUTPATIENT)
Dept: LAB | Facility: HOSPITAL | Age: 39
End: 2024-01-27
Attending: FAMILY MEDICINE
Payer: COMMERCIAL

## 2024-01-27 LAB
RUBV IGG SER QL: POSITIVE
RUBV IGG SER-ACNC: 27.8 IU/ML (ref 10–?)

## 2024-01-27 PROCEDURE — 86762 RUBELLA ANTIBODY: CPT | Performed by: FAMILY MEDICINE

## 2024-01-27 PROCEDURE — 86787 VARICELLA-ZOSTER ANTIBODY: CPT | Performed by: FAMILY MEDICINE

## 2024-01-27 PROCEDURE — 86765 RUBEOLA ANTIBODY: CPT | Performed by: FAMILY MEDICINE

## 2024-01-27 PROCEDURE — 86480 TB TEST CELL IMMUN MEASURE: CPT | Performed by: FAMILY MEDICINE

## 2024-01-27 PROCEDURE — 86735 MUMPS ANTIBODY: CPT | Performed by: FAMILY MEDICINE

## 2024-01-27 PROCEDURE — 36415 COLL VENOUS BLD VENIPUNCTURE: CPT | Performed by: FAMILY MEDICINE

## 2024-01-29 LAB
M TB IFN-G CD4+ T-CELLS BLD-ACNC: 0.24 IU/ML
M TB TUBERC IFN-G BLD QL: NEGATIVE
M TB TUBERC IGNF/MITOGEN IGNF CONTROL: >10 IU/ML
MEV IGG SER-ACNC: >300 AU/ML (ref 16.5–?)
MUV IGG SER IA-ACNC: 138 AU/ML (ref 11–?)
QFT TB1 AG MINUS NIL: -0.04 IU/ML
QFT TB2 AG MINUS NIL: -0.04 IU/ML
VZV IGG SER IA-ACNC: 2932 (ref 165–?)

## 2024-02-06 ENCOUNTER — OFFICE VISIT (OUTPATIENT)
Dept: FAMILY MEDICINE CLINIC | Facility: CLINIC | Age: 39
End: 2024-02-06
Payer: COMMERCIAL

## 2024-02-06 VITALS
SYSTOLIC BLOOD PRESSURE: 110 MMHG | WEIGHT: 129 LBS | BODY MASS INDEX: 22.02 KG/M2 | TEMPERATURE: 97 F | HEART RATE: 66 BPM | DIASTOLIC BLOOD PRESSURE: 70 MMHG | HEIGHT: 64 IN | RESPIRATION RATE: 16 BRPM | OXYGEN SATURATION: 99 %

## 2024-02-06 DIAGNOSIS — Z00.00 WELL ADULT EXAM: Primary | ICD-10-CM

## 2024-02-06 PROCEDURE — 99396 PREV VISIT EST AGE 40-64: CPT | Performed by: PHYSICIAN ASSISTANT

## 2024-02-06 PROCEDURE — 3008F BODY MASS INDEX DOCD: CPT | Performed by: PHYSICIAN ASSISTANT

## 2024-02-06 PROCEDURE — 3078F DIAST BP <80 MM HG: CPT | Performed by: PHYSICIAN ASSISTANT

## 2024-02-06 PROCEDURE — 3074F SYST BP LT 130 MM HG: CPT | Performed by: PHYSICIAN ASSISTANT

## 2024-02-06 NOTE — PROGRESS NOTES
DATE OF EXAM  2024    CHIEF COMPLAINT/REASON FOR VISIT  Annual exam.    HISTORY OF PRESENT ILLNESS  Anthony Troy presents today for routine annual physical examination.  Needs  work form completed. Had updated quantiferon, MMRV titers- negative. Believes that she had her flu shot at [facility]. No other concerns today.     Current Outpatient Medications on File Prior to Visit   Medication Sig Dispense Refill    Rizatriptan Benzoate 5 MG Oral Tab Take 1 tablet (5 mg total) by mouth as needed for Migraine. 30 tablet 1     No current facility-administered medications on file prior to visit.     Allergies:  Benzalkonium chloride, Neosporin wound cleanser, Neosporin [bacitracin-polymyxin b], and Dander     Patient Active Problem List   Diagnosis    Acne    Migraine without aura and without status migrainosus, not intractable    Current mild episode of major depressive disorder without prior episode (HCC)    Pregnancy    Decreased fetal movements affecting management of mother, antepartum    AMA (advanced maternal age) primigravida 35+, third trimester    Fetal intolerance to labor, delivered, current hospitalization       Past Surgical History:   Procedure Laterality Date          [place] teeth removed         Social History     Socioeconomic History    Marital status:     Number of children: 1    Years of education: 16   Tobacco Use    Smoking status: Former     Years: 8     Types: Cigarettes     Quit date: 3/2/2014     Years since quittin.9    Smokeless tobacco: Never   Vaping Use    Vaping Use: Never used   Substance and Sexual Activity    Alcohol use: Not Currently     Alcohol/week: 4.0 standard drinks of alcohol     Types: 4 Shots of liquor per week    Drug use: Not Currently     Comment: last used in end of     Sexual activity: Yes     Partners: Male     Birth control/protection: Paragard     Comment: paragard 10/12/23   Other Topics Concern    Caffeine Concern No     Exercise Yes     Comment: 6 times weekly- HIIT    Seat Belt Yes    Self-Exams Yes       Family History   Problem Relation Age of Onset    Lipids Mother     Diabetes Father     Lipids Father     Asthma Brother     Stroke Maternal Grandmother     Cancer Neg     Heart Attack Neg        Health maintenance:  Health Maintenance   Topic Date Due    Influenza Vaccine (1) 10/01/2023    Annual Physical  11/07/2023    Annual Depression Screening  01/01/2024    Mammogram  06/13/2024    Pap Smear  03/14/2025    DTaP,Tdap,and Td Vaccines (3 - Td or Tdap) 08/23/2031    COVID-19 Vaccine  Completed    Pneumococcal Vaccine: Birth to 64yrs  Aged Out       REVIEW OF SYSTEMS  GENERAL: No fever, chills, or fatigue  ENDOCRINE: No weight loss or weight gain. No heat or cold intolerances. No increased thirst.  HEENT: No problems with head, eyes, ears, nose, throat.  No cold or recent illness. No visual changes. No troubles hearing. No troubles swallowing.  RESPIRATORY: No cough or shortness of breath  HEART: No chest pain, irregular heartbeat, swelling extremities.  MUSCULOSKELETAL:  No muscle pains, muscle aches, joint pains, swelling in joints.  ABDOMEN: No abdominal pain.  GI: No nausea, vomiting, diarrhea, or constipation. No change in bowel movements. No black or tarry stools or blood in the stool.   : No increased frequency or urgency. No hematuria. No menstrual problems.   BREASTS:  Patient denies any lumps, bumps, discharge, or pain.  PSYCH: No anxiety or depression. No suicidal ideation.  SKIN: No rashes or lesions.  NEURO:  No recent headaches, falling, on numbness, tingling, weakness.      PHYSICAL EXAMINATION  Blood pressure 110/70, pulse 66, temperature 97 °F (36.1 °C), resp. rate 16, height 5' 4\" (1.626 m), weight 129 lb (58.5 kg), last menstrual period 02/03/2024, SpO2 99%, not currently breastfeeding.   Body mass index is 22.14 kg/m².    GENERAL: Well-nourished, well-developed 38 year old year old in no apparent distress.   Awake, alert, age appropriate.  SKIN:  Warm, pink, and dry.  No rashes, excoriations, open areas.  HEENT: Head is normocephalic, atraumatic.  Bilateral pupils are equal, reactive to light, and accommodation. EOMs intact. Conjunctivae and sclera are clear.  Bilateral external ears nontender to manipulation with clear canals. Bilateral TMs normal.    Hearing is grossly normal.  Nares patent with normal mucosa.   Oropharynx pink and moist without exudate or erythema.  NECK: Supple without lymphadenopathy.  BREASTS: Deferred.  CARDIOVASCULAR: Regular rate and rhythm with no murmurs, rubs, or gallops.  LUNGS: Normal effort on room air.  Clear to auscultation throughout.  ABDOMEN:  Bowel sounds present throughout. Rounded, soft, without tenderness to palpation with no organomegaly.  PELVIC:  Deferred.  MUSCULOSKELETAL: Normal active range of motion in all extremities. 5/5 strength in upper and lower extremities, equal bilaterally.   NEUROLOGIC: Alert and oriented x 3.   PSYCH: Mood and affect normal.    IMPRESSION/REPORT/PLAN    1.Routine physical examination:  Patient is doing well overall. Discussed age appropriate health topics including: regular exercise, balanced diet, sunscreen, monitoring moles, adequate calcium and vitamin D intake. Health maintenance is up to date as shown above.  Recommend complete physical exams every year.    Patient expresses understanding and agreement with the above plan.   Anirudh Lopez PA-C

## 2024-04-05 ENCOUNTER — PATIENT MESSAGE (OUTPATIENT)
Dept: FAMILY MEDICINE CLINIC | Facility: CLINIC | Age: 39
End: 2024-04-05

## 2024-04-05 ENCOUNTER — TELEPHONE (OUTPATIENT)
Dept: FAMILY MEDICINE CLINIC | Facility: CLINIC | Age: 39
End: 2024-04-05

## 2024-04-05 DIAGNOSIS — R92.8 ABNORMAL MAMMOGRAM OF RIGHT BREAST: Primary | ICD-10-CM

## 2024-04-05 DIAGNOSIS — N64.4 BREAST PAIN, LEFT: ICD-10-CM

## 2024-04-06 NOTE — TELEPHONE ENCOUNTER
From: Anthony Troy  To: Argelia Mccormack  Sent: 4/5/2024 7:49 PM CDT  Subject: Mammogram     Thanks for putting through my request for the follow-up breast biopsy mammogram. I noticed that it says right breast only and I’m interested in getting both breasts checked out again. Also does this include the ultrasound option if needed in addition to the mammogram?

## 2024-04-26 ENCOUNTER — HOSPITAL ENCOUNTER (OUTPATIENT)
Dept: MAMMOGRAPHY | Facility: HOSPITAL | Age: 39
Discharge: HOME | End: 2024-04-26
Attending: FAMILY MEDICINE
Payer: COMMERCIAL

## 2024-04-26 DIAGNOSIS — N64.4 BREAST PAIN, LEFT: ICD-10-CM

## 2024-04-26 DIAGNOSIS — R92.8 ABNORMAL MAMMOGRAM OF RIGHT BREAST: ICD-10-CM

## 2024-04-26 PROCEDURE — 77062 BREAST TOMOSYNTHESIS BI: CPT | Performed by: FAMILY MEDICINE

## 2024-04-26 PROCEDURE — 77066 DX MAMMO INCL CAD BI: CPT | Performed by: FAMILY MEDICINE

## 2024-04-27 ENCOUNTER — OFFICE VISIT (OUTPATIENT)
Dept: FAMILY MEDICINE CLINIC | Facility: CLINIC | Age: 39
End: 2024-04-27
Payer: COMMERCIAL

## 2024-04-27 VITALS
BODY MASS INDEX: 21.48 KG/M2 | WEIGHT: 125.81 LBS | HEIGHT: 64 IN | TEMPERATURE: 97 F | DIASTOLIC BLOOD PRESSURE: 64 MMHG | SYSTOLIC BLOOD PRESSURE: 118 MMHG | RESPIRATION RATE: 16 BRPM | HEART RATE: 88 BPM

## 2024-04-27 DIAGNOSIS — Z02.89 ENCOUNTER FOR COMPLETION OF FORM WITH PATIENT: ICD-10-CM

## 2024-04-27 DIAGNOSIS — M71.22 POPLITEAL CYST, LEFT: Primary | ICD-10-CM

## 2024-04-27 PROBLEM — Z34.90 PREGNANCY (HCC): Status: RESOLVED | Noted: 2021-10-04 | Resolved: 2024-04-27

## 2024-04-27 NOTE — PROGRESS NOTES
Chief Complaint   Patient presents with    Problem     Paper filled out for work        HPI:  Presents for follow up of mass to left popliteal space. Was seen for this in Sept 2023. Referred to ortho, Dr. Díaz who ordered xray. Xray w/o significant abnormality. Dr. Díaz then ordered MRI. Patient reports insurance will not cover MRI without completing US first. Reports due to her work schedule has been unable to get follow up with Dr. Díaz, so wondering if US can be ordered here. Reports is not sure if size of lump has increased but she can feel its presence most of the time. Denies it limits motions or activity. Denies redness of lump or leg. Denies swelling of other parts of leg. ]    Also, needs form completed for work. Already had form complete but her work lost it, needs refilled out.     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 carrier       Patient Active Problem List   Diagnosis    Acne    Migraine without aura and without status migrainosus, not intractable    Current mild episode of major depressive disorder without prior episode (HCC)       Current Outpatient Medications   Medication Sig Dispense Refill    Rizatriptan Benzoate 5 MG Oral Tab Take 1 tablet (5 mg total) by mouth as needed for Migraine. 30 tablet 1       Physical Exam  /64   Pulse 88   Temp 97.1 °F (36.2 °C)   Resp 16   Ht 5' 4\" (1.626 m)   Wt 125 lb 12.8 oz (57.1 kg)   LMP 04/25/2024 (Exact Date)   BMI 21.59 kg/m²   Constitutional: well developed, well nourished, in no apparent distress  HEENT: Normocephalic and atraumatic.   Cardiovascular: Normal rate.   Pulmonary/Chest: No respiratory distress. Effort normal.  Ext:  left knee with semi-firm, non-tender, non-erythematous, palpable mass to  popliteal space approx 4cm x 5cm w/o generalized edema. Rest of knee w/o erythema, masses, deformity, TTP, limited ROM, clicking or popping.   Skin: Skin is warm and dry. No rash noted. No erythema. No pallor.     A/P:    Encounter Diagnoses   Name Primary?    Popliteal cyst, left- check US. Discussed further management will be up to ortho specialty but will await results of US. Verbalized understanding of instructions and agreeable to this plan of care.    Yes    Encounter for completion of form with patient- form completed for patient. Copy sent to scan.       Total visit time was 22 minutes, including 17 minutes of face to face visit time and 5 minutes of documentation and chart review.       No orders of the defined types were placed in this encounter.      Meds & Refills for this Visit:  Requested Prescriptions      No prescriptions requested or ordered in this encounter       Imaging & Consults:  US KNEE LEFT LIMITED (CPT=76882)    No follow-ups on file.  There are no Patient Instructions on file for this visit.    All questions were answered and the patient understands the plan.

## 2024-05-23 RX ORDER — RIZATRIPTAN BENZOATE 5 MG/1
5 TABLET ORAL AS NEEDED
Qty: 30 TABLET | Refills: 0 | Status: SHIPPED | OUTPATIENT
Start: 2024-05-23

## 2024-05-23 RX ORDER — RIZATRIPTAN BENZOATE 5 MG/1
5 TABLET ORAL AS NEEDED
Qty: 30 TABLET | Refills: 1 | OUTPATIENT
Start: 2024-05-23

## 2024-05-23 NOTE — TELEPHONE ENCOUNTER
Patient was seen 10/12/23 for an INTRAUTERINE DEVICE insertion & 11/9/23 for a follow up. Requesting a refill on her Rizatriptan Benzoate 5 MG Oral Tab. Will route for review.

## 2024-07-16 ENCOUNTER — HOSPITAL ENCOUNTER (OUTPATIENT)
Dept: ULTRASOUND IMAGING | Facility: HOSPITAL | Age: 39
Discharge: HOME | End: 2024-07-16
Attending: NURSE PRACTITIONER
Payer: COMMERCIAL

## 2024-07-16 DIAGNOSIS — M71.22 POPLITEAL CYST, LEFT: ICD-10-CM

## 2024-07-16 PROCEDURE — 76882 US LMTD JT/FCL EVL NVASC XTR: CPT | Performed by: NURSE PRACTITIONER

## 2024-07-30 ENCOUNTER — OFFICE VISIT (OUTPATIENT)
Dept: FAMILY MEDICINE CLINIC | Facility: CLINIC | Age: 39
End: 2024-07-30
Payer: COMMERCIAL

## 2024-07-30 VITALS
SYSTOLIC BLOOD PRESSURE: 136 MMHG | HEART RATE: 75 BPM | DIASTOLIC BLOOD PRESSURE: 80 MMHG | RESPIRATION RATE: 16 BRPM | TEMPERATURE: 99 F | BODY MASS INDEX: 21.34 KG/M2 | OXYGEN SATURATION: 99 % | WEIGHT: 125 LBS | HEIGHT: 64 IN

## 2024-07-30 DIAGNOSIS — N30.91 CYSTITIS WITH HEMATURIA: Primary | ICD-10-CM

## 2024-07-30 DIAGNOSIS — Z01.89 PATIENT REQUEST FOR DIAGNOSTIC TESTING: ICD-10-CM

## 2024-07-30 DIAGNOSIS — R39.9 UTI SYMPTOMS: ICD-10-CM

## 2024-07-30 LAB
BILIRUBIN: NEGATIVE
GLUCOSE (URINE DIPSTICK): NEGATIVE MG/DL
KETONES (URINE DIPSTICK): NEGATIVE MG/DL
MULTISTIX LOT#: ABNORMAL NUMERIC
NITRITE, URINE: NEGATIVE
PH, URINE: 6.5 (ref 4.5–8)
PROTEIN (URINE DIPSTICK): NEGATIVE MG/DL
SPECIFIC GRAVITY: >=1.03 (ref 1–1.03)
URINE-COLOR: YELLOW
UROBILINOGEN,SEMI-QN: 0.2 MG/DL (ref 0–1.9)

## 2024-07-30 PROCEDURE — 87591 N.GONORRHOEAE DNA AMP PROB: CPT | Performed by: NURSE PRACTITIONER

## 2024-07-30 PROCEDURE — 3075F SYST BP GE 130 - 139MM HG: CPT | Performed by: NURSE PRACTITIONER

## 2024-07-30 PROCEDURE — 3008F BODY MASS INDEX DOCD: CPT | Performed by: NURSE PRACTITIONER

## 2024-07-30 PROCEDURE — 87086 URINE CULTURE/COLONY COUNT: CPT | Performed by: NURSE PRACTITIONER

## 2024-07-30 PROCEDURE — 3079F DIAST BP 80-89 MM HG: CPT | Performed by: NURSE PRACTITIONER

## 2024-07-30 PROCEDURE — 87491 CHLMYD TRACH DNA AMP PROBE: CPT | Performed by: NURSE PRACTITIONER

## 2024-07-30 PROCEDURE — 81003 URINALYSIS AUTO W/O SCOPE: CPT | Performed by: NURSE PRACTITIONER

## 2024-07-30 PROCEDURE — 99213 OFFICE O/P EST LOW 20 MIN: CPT | Performed by: NURSE PRACTITIONER

## 2024-07-30 RX ORDER — NITROFURANTOIN 25; 75 MG/1; MG/1
100 CAPSULE ORAL 2 TIMES DAILY
Qty: 10 CAPSULE | Refills: 0 | Status: SHIPPED | OUTPATIENT
Start: 2024-07-30 | End: 2024-08-04

## 2024-07-31 NOTE — PROGRESS NOTES
CHIEF COMPLAINT:     Chief Complaint   Patient presents with    Urinary Symptoms     1 week, frequency, tingling at end of urination, no otc         HPI:    Anthony Troy is a 39 year old female who presents with symptoms of UTI. Complaining of urinary frequency  for last 7 days. + dysuria: \"tingling\" post voiding. Symptoms have been the same since onset. Reports menstrual- like cramping off and on with each period, has copper IUD, however continues to have period-like bleeding every 3 weeks.  Has treated symptoms with no treatment. Denies flank pain, fever, hematuria, nausea, or vomiting. Requests asymptomatic G/C testing.   Current Outpatient Medications   Medication Sig Dispense Refill    nitrofurantoin monohydrate macro (MACROBID) 100 MG Oral Cap Take 1 capsule (100 mg total) by mouth 2 (two) times daily for 5 days. 10 capsule 0    Rizatriptan Benzoate 5 MG Oral Tab Take 1 tablet (5 mg total) by mouth as needed for Migraine. 30 tablet 0      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 carrier      Past Surgical History:   Procedure Laterality Date          Needle biopsy right       benign u/s guided bx-fibroadenoma    [place] teeth removed        Family History   Problem Relation Age of Onset    Lipids Mother     Diabetes Father     Lipids Father     Asthma Brother     Stroke Maternal Grandmother     Asthma Brother     Cancer Brother     Heart Attack Neg       Social History     Socioeconomic History    Marital status:     Number of children: 1    Years of education: 16   Tobacco Use    Smoking status: Former     Current packs/day: 0.00     Types: Cigarettes     Start date: 3/2/2006     Quit date: 3/2/2014     Years since quitting: 10.4    Smokeless tobacco:  Never   Vaping Use    Vaping status: Never Used   Substance and Sexual Activity    Alcohol use: Not Currently     Alcohol/week: 4.0 standard drinks of alcohol    Drug use: Not Currently     Comment: last used in end of feb/march    Sexual activity: Yes     Partners: Male     Birth control/protection: Paragard     Comment: paragard 10/12/23   Other Topics Concern    Caffeine Concern No    Stress Concern No    Weight Concern No    Special Diet No    Exercise Yes    Seat Belt Yes    Self-Exams Yes        REVIEW OF SYSTEMS:   GENERAL: Denies fever, chills, or body aches  SKIN: no rashes, no skin wounds or ulcers  Cardiovascular:denies palpations or CP  Respiratory:denies SOB  GI:denies abdominal pain, nausea or vomiting  : See HPI.  NEURO: no headaches.  EXAM:   /80   Pulse 75   Temp 98.5 °F (36.9 °C)   Resp 16   Ht 5' 4\" (1.626 m)   Wt 125 lb (56.7 kg)   LMP 07/09/2024 (Approximate)   SpO2 99%   BMI 21.46 kg/m²   General: alert and oriented x 3, no acute distress   Heart: RRR. S1, S2, no murmurs.    Lungs: breathing is non labored, clear to auscultation bilaterally, no wheezing, rales or rhonchi, or rales    Abdomen: soft, NT/ND, no rebound tenderness or guarding, no hepatosplenomegaly  : no suprapubic tenderness. No bladder distention, or CVAT   Skin: unremarkable without rashes.    Neuro: no focal abnormalities    Recent Results (from the past 24 hour(s))   URINALYSIS, AUTO, W/O SCOPE    Collection Time: 07/30/24  6:56 PM   Result Value Ref Range    Glucose Urine Negative Negative mg/dL    Bilirubin Urine Negative Negative    Ketones, UA Negative Negative - Trace mg/dL    Spec Gravity >=1.030 1.005 - 1.030    Blood Urine Moderate (A) Negative    PH Urine 6.5 5.0 - 8.0    Protein Urine Negative Negative - Trace mg/dL    Urobilinogen Urine 0.2 0.2 - 1.0 mg/dL    Nitrite Urine Negative Negative    Leukocyte Esterase Urine Small (A) Negative    APPEARANCE cloudy Clear    Color Urine yellow Yellow     Multistix Lot# [id number] Numeric    Multistix Expiration Date 6/30/25 Date       ASSESSMENT:   Anthony Troy is a 39 year old female who presents with Urinary Symptoms (1 week, frequency, tingling at end of urination, no otc ). Symptoms are consistent with:    Encounter Diagnoses   Name Primary?    UTI symptoms Yes    Patient request for diagnostic testing        PLAN   Increase water intake. Avoid caffeine, carbonated, tea, lemon and alcholic beverages as they can be bladder irritants.  Urine culture sent.    Discussed STI testing results will return within 48 hours or less and will be called for treatment if infections is detected.  Recommend repeat urinalysis with PCP once symptoms resolved and treatment complete to ensure clearance of hematuria/other urine abnormalities within 1-2 weeks post therapy .   Risks, benefits, and side effects of medication explained and discussed. Medications as listed below.         Medications    nitrofurantoin monohydrate macro (MACROBID) 100 MG Oral Cap       Patient instructions handed to patient prior to departure.  Follow up prn or with PCP if symptoms worsen or persist within 2-3 days as discussed.  Patient understands and agrees with plan.     Bianca Solares, APRN  7/30/2024  7:14 PM

## 2024-07-31 NOTE — PATIENT INSTRUCTIONS
1. Take Macrobid as prescribed.  2. Drink plenty of fluids to keep well-hydrated.   3. Ensure that you urinate frequently, emptying your bladder completely each time.   4. Follow up with primary care physician in the next 1-2 weeks.   5. Return to care sooner if symptoms do not improve in the next 2-3 days or you develop fever, flank pain, increased blood in urine, inability to urinate.  6. We will notify you of urine culture results in the next few days. At this time we will suggest to either discontinue, change, or continue the medication.

## 2024-08-01 ENCOUNTER — TELEPHONE (OUTPATIENT)
Dept: FAMILY MEDICINE CLINIC | Facility: CLINIC | Age: 39
End: 2024-08-01

## 2024-08-01 DIAGNOSIS — A74.9 CHLAMYDIA: Primary | ICD-10-CM

## 2024-08-01 LAB
C TRACH DNA SPEC QL NAA+PROBE: POSITIVE
N GONORRHOEA DNA SPEC QL NAA+PROBE: NEGATIVE

## 2024-08-01 RX ORDER — DOXYCYCLINE HYCLATE 100 MG
100 TABLET ORAL 2 TIMES DAILY
Qty: 14 TABLET | Refills: 0 | Status: SHIPPED | OUTPATIENT
Start: 2024-08-01 | End: 2024-08-08

## 2024-08-01 NOTE — TELEPHONE ENCOUNTER
Pt informed of positive chlamydia results. Informed to notify any partners. F/u with pcp In 1 month.

## 2024-08-01 NOTE — TELEPHONE ENCOUNTER
Patient, returning your call regarding, discussing test results. Please call patient again, to discuss test results.

## 2024-09-23 ENCOUNTER — LAB ENCOUNTER (OUTPATIENT)
Dept: LAB | Facility: HOSPITAL | Age: 39
End: 2024-09-23
Attending: NURSE PRACTITIONER
Payer: COMMERCIAL

## 2024-09-23 DIAGNOSIS — R55 PRE-SYNCOPE: ICD-10-CM

## 2024-09-23 LAB
ALBUMIN SERPL-MCNC: 4.4 G/DL (ref 3.2–4.8)
ALBUMIN/GLOB SERPL: 1.8 {RATIO} (ref 1–2)
ALP LIVER SERPL-CCNC: 62 U/L
ALT SERPL-CCNC: 13 U/L
ANION GAP SERPL CALC-SCNC: 6 MMOL/L (ref 0–18)
AST SERPL-CCNC: 15 U/L (ref ?–34)
BASOPHILS # BLD AUTO: 0.06 X10(3) UL (ref 0–0.2)
BASOPHILS NFR BLD AUTO: 0.7 %
BILIRUB SERPL-MCNC: 0.4 MG/DL (ref 0.3–1.2)
BUN BLD-MCNC: 10 MG/DL (ref 9–23)
CALCIUM BLD-MCNC: 9.2 MG/DL (ref 8.7–10.4)
CHLORIDE SERPL-SCNC: 105 MMOL/L (ref 98–112)
CO2 SERPL-SCNC: 27 MMOL/L (ref 21–32)
CREAT BLD-MCNC: 0.79 MG/DL
EGFRCR SERPLBLD CKD-EPI 2021: 96 ML/MIN/1.73M2 (ref 60–?)
EOSINOPHIL # BLD AUTO: 0.52 X10(3) UL (ref 0–0.7)
EOSINOPHIL NFR BLD AUTO: 5.7 %
ERYTHROCYTE [DISTWIDTH] IN BLOOD BY AUTOMATED COUNT: 12.8 %
FASTING STATUS PATIENT QL REPORTED: YES
GLOBULIN PLAS-MCNC: 2.5 G/DL (ref 2–3.5)
GLUCOSE BLD-MCNC: 80 MG/DL (ref 70–99)
HCT VFR BLD AUTO: 35.8 %
HGB BLD-MCNC: 12 G/DL
IMM GRANULOCYTES # BLD AUTO: 0.03 X10(3) UL (ref 0–1)
IMM GRANULOCYTES NFR BLD: 0.3 %
LYMPHOCYTES # BLD AUTO: 2.84 X10(3) UL (ref 1–4)
LYMPHOCYTES NFR BLD AUTO: 31 %
MCH RBC QN AUTO: 30.5 PG (ref 26–34)
MCHC RBC AUTO-ENTMCNC: 33.5 G/DL (ref 31–37)
MCV RBC AUTO: 91.1 FL
MONOCYTES # BLD AUTO: 0.61 X10(3) UL (ref 0.1–1)
MONOCYTES NFR BLD AUTO: 6.7 %
NEUTROPHILS # BLD AUTO: 5.09 X10 (3) UL (ref 1.5–7.7)
NEUTROPHILS # BLD AUTO: 5.09 X10(3) UL (ref 1.5–7.7)
NEUTROPHILS NFR BLD AUTO: 55.6 %
OSMOLALITY SERPL CALC.SUM OF ELEC: 284 MOSM/KG (ref 275–295)
PLATELET # BLD AUTO: 267 10(3)UL (ref 150–450)
POTASSIUM SERPL-SCNC: 4.4 MMOL/L (ref 3.5–5.1)
PROT SERPL-MCNC: 6.9 G/DL (ref 5.7–8.2)
RBC # BLD AUTO: 3.93 X10(6)UL
SODIUM SERPL-SCNC: 138 MMOL/L (ref 136–145)
TSI SER-ACNC: 1.98 MIU/ML (ref 0.55–4.78)
WBC # BLD AUTO: 9.2 X10(3) UL (ref 4–11)

## 2024-09-23 PROCEDURE — 80053 COMPREHEN METABOLIC PANEL: CPT

## 2024-09-23 PROCEDURE — 36415 COLL VENOUS BLD VENIPUNCTURE: CPT

## 2024-09-23 PROCEDURE — 85025 COMPLETE CBC W/AUTO DIFF WBC: CPT

## 2024-09-23 PROCEDURE — 84443 ASSAY THYROID STIM HORMONE: CPT

## 2024-10-10 ENCOUNTER — OFFICE VISIT (OUTPATIENT)
Facility: CLINIC | Age: 39
End: 2024-10-10
Payer: COMMERCIAL

## 2024-10-10 VITALS
BODY MASS INDEX: 21.17 KG/M2 | HEART RATE: 66 BPM | HEIGHT: 64 IN | DIASTOLIC BLOOD PRESSURE: 68 MMHG | WEIGHT: 124 LBS | SYSTOLIC BLOOD PRESSURE: 118 MMHG

## 2024-10-10 DIAGNOSIS — Z11.3 ROUTINE SCREENING FOR STI (SEXUALLY TRANSMITTED INFECTION): ICD-10-CM

## 2024-10-10 DIAGNOSIS — Z11.3 SCREENING EXAMINATION FOR STI: ICD-10-CM

## 2024-10-10 DIAGNOSIS — Z01.419 ENCOUNTER FOR WELL WOMAN EXAM WITH ROUTINE GYNECOLOGICAL EXAM: Primary | ICD-10-CM

## 2024-10-10 PROCEDURE — 3074F SYST BP LT 130 MM HG: CPT

## 2024-10-10 PROCEDURE — 3078F DIAST BP <80 MM HG: CPT

## 2024-10-10 PROCEDURE — 99396 PREV VISIT EST AGE 40-64: CPT

## 2024-10-10 PROCEDURE — 87591 N.GONORRHOEAE DNA AMP PROB: CPT

## 2024-10-10 PROCEDURE — 87491 CHLMYD TRACH DNA AMP PROBE: CPT

## 2024-10-10 PROCEDURE — 3008F BODY MASS INDEX DOCD: CPT

## 2024-10-10 NOTE — PROGRESS NOTES
Anthony Troy is a 39 year old female  Patient's last menstrual period was 2024 (approximate).   Chief Complaint   Patient presents with    Sexually Transmitted Infection Screen     Wants STD check  - chlamydia positive before, pt slept with same person again     Wellness Visit     WWE      Other     Pt said NO to the student in the room   .  Mammogram completed 24 - Dr Mccormack ordered     OBSTETRICS HISTORY:  OB History    Para Term  AB Living   1 1 1     1   SAB IAB Ectopic Multiple Live Births           1      # Outcome Date GA Lbr Karan/2nd Weight Sex Type Anes PTL Lv   1 Term 10/31/21 39w6d  6 lb 13.4 oz (3.1 kg) M Caesarean EPI N ENEIDA      Complications: Variable decelerations, Late decelerations, Fetal intolerance to labor       GYNE HISTORY:  Periods regular monthly    History   Sexual Activity    Sexual activity: Yes    Partners: Male    Birth control/ protection: Paragard     Comment: paragard 10/12/23                 MEDICAL HISTORY:  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 carrier       SURGICAL HISTORY:  Past Surgical History:   Procedure Laterality Date          Needle biopsy right       benign u/s guided bx-fibroadenoma    [place] teeth removed         SOCIAL HISTORY:  Social History     Socioeconomic History    Marital status:      Spouse name: Not on file    Number of children: 1    Years of education: 16    Highest education level: Not on file   Occupational History    Not on file   Tobacco Use    Smoking status: Former     Current packs/day: 0.00     Types: Cigarettes     Start date: 3/2/2006     Quit date: 3/2/2014     Years since quitting: 10.6    Smokeless tobacco: Never   Vaping Use    Vaping status: Never Used    Substance and Sexual Activity    Alcohol use: Not Currently     Alcohol/week: 4.0 standard drinks of alcohol    Drug use: Not Currently     Comment: last used in end of feb/march    Sexual activity: Yes     Partners: Male     Birth control/protection: Paragard     Comment: paragard 10/12/23   Other Topics Concern     Service Not Asked    Blood Transfusions Not Asked    Caffeine Concern No    Occupational Exposure Not Asked    Hobby Hazards Not Asked    Sleep Concern Not Asked    Stress Concern No    Weight Concern No    Special Diet No    Back Care Not Asked    Exercise Yes    Bike Helmet Not Asked    Seat Belt Yes    Self-Exams Yes   Social History Narrative    Not on file     Social Drivers of Health     Financial Resource Strain: Not on file   Food Insecurity: Not on file   Transportation Needs: Not on file   Physical Activity: Not on file   Stress: Not on file   Social Connections: Not on file   Housing Stability: Not on file       FAMILY HISTORY:  Family History   Problem Relation Age of Onset    Diabetes Father     Lipids Father     Cancer Mother         non hodgkin lymphoma dx age 60s    Lipids Mother     No Known Problems Son     Stroke Maternal Grandmother     No Known Problems Maternal Grandfather     No Known Problems Paternal Grandmother     No Known Problems Paternal Grandfather     Asthma Brother     Cancer Brother 35        esophageal and liver cancer    No Known Problems Sister     No Known Problems Sister     No Known Problems Sister     Heart Attack Neg     Prostate Cancer Neg     Breast Cancer Neg     Ovarian Cancer Neg     Uterine Cancer Neg     Pancreatic Cancer Neg     Colon Cancer Neg        MEDICATIONS:    Current Outpatient Medications:     Rizatriptan Benzoate 5 MG Oral Tab, Take 1 tablet (5 mg total) by mouth as needed for Migraine., Disp: 30 tablet, Rfl: 0    ALLERGIES:  Allergies[1]      Review of Systems:  Constitutional:  Denies fatigue, night sweats, hot flashes  Eyes:   denies blurred or double vision  Cardiovascular:  denies chest pain or palpitations  Respiratory:  denies shortness of breath  Gastrointestinal:  denies heartburn, abdominal pain, diarrhea or constipation  Genitourinary:  denies dysuria, incontinence, abnormal vaginal discharge, vaginal itching  Musculoskeletal:  denies back pain.  Skin/Breast:  Denies any breast pain, lumps, or discharge.   Neurological:  denies headaches, extremity weakness or numbness.  Psychiatric: denies depression or anxiety.  Endocrine:   denies excessive thirst or urination.  Heme/Lymph:  denies history of anemia, easy bruising or bleeding.      PHYSICAL EXAM:   Constitutional: well developed, well nourished  Head/Face: normocephalic  Neck/Thyroid: thyroid symmetric, no thyromegaly, no nodules, no adenopathy  Lymphatic:no abnormal supraclavicular or axillary adenopathy is noted  Breast: normal without palpable masses, tenderness, asymmetry, nipple discharge, nipple retraction or skin changes  Abdomen:  soft, nontender, nondistended, no masses  Skin/Hair: no unusual rashes or bruises  Extremities: no edema, no cyanosis  Psychiatric:  Oriented to time, place, person and situation. Appropriate mood and affect    Pelvic Exam:  External Genitalia: normal appearance, hair distribution, and no lesions  Urethral Meatus:  normal in size, location, without lesions and prolapse  Bladder:  No fullness, masses or tenderness  Vagina:  Normal appearance without lesions, no abnormal discharge  Cervix:  Normal without tenderness on motion, IUD strings seen   Uterus: normal in size, contour, position, mobility, without tenderness  Adnexa: normal without masses or tenderness  Perineum: normal  Anus: no hemorroids     Assessment & Plan:  Diagnoses and all orders for this visit:    Encounter for well woman exam with routine gynecological exam    Routine screening for STI (sexually transmitted infection)  -     Chlamydia/Gc Amplification; Future    Screening  examination for STI  -     HIV AG AB COMBO; Future  -     T Pallidum Screening Cascade; Future  -     HCV Antibody; Future  -     Hepatitis B Surface Antigen; Future                   [1]   Allergies  Allergen Reactions    Benzalkonium Chloride HIVES, ITCHING, PAIN and SWELLING    Neosporin Wound Cleanser HIVES, ITCHING, PAIN and SWELLING    Neosporin [Bacitracin-Polymyxin B] HIVES    Dander ITCHING

## 2024-10-11 LAB
C TRACH DNA SPEC QL NAA+PROBE: NEGATIVE
N GONORRHOEA DNA SPEC QL NAA+PROBE: NEGATIVE

## 2024-10-30 LAB
AMB EXT BUN: 11 MG/DL
AMB EXT CHOLESTEROL, TOTAL: 203 MG/DL
AMB EXT CMP ALT: 16 U/L
AMB EXT CMP AST: 17 U/L
AMB EXT CREATININE: 0.76 MG/DL
AMB EXT EGFR NON-AA: 101
AMB EXT GLUCOSE: 80 MG/DL
AMB EXT HDL CHOLESTEROL: 70 MG/DL
AMB EXT HEMATOCRIT: 39.3
AMB EXT HEMOGLOBIN: 12
AMB EXT LDL CHOLESTEROL, DIRECT: 120 MG/DL
AMB EXT PLATELETS: 293
AMB EXT TRIGLYCERIDES: 71 MG/DL
AMB EXT WBC: 7.9 X10(3)UL

## 2024-11-04 ENCOUNTER — OFFICE VISIT (OUTPATIENT)
Dept: FAMILY MEDICINE CLINIC | Facility: CLINIC | Age: 39
End: 2024-11-04
Payer: COMMERCIAL

## 2024-11-04 VITALS
OXYGEN SATURATION: 98 % | DIASTOLIC BLOOD PRESSURE: 78 MMHG | BODY MASS INDEX: 21.89 KG/M2 | WEIGHT: 126.63 LBS | SYSTOLIC BLOOD PRESSURE: 118 MMHG | RESPIRATION RATE: 16 BRPM | HEART RATE: 89 BPM | HEIGHT: 63.86 IN

## 2024-11-04 DIAGNOSIS — M71.22 POPLITEAL CYST, LEFT: Primary | ICD-10-CM

## 2024-11-04 DIAGNOSIS — A60.04 HERPES SIMPLEX VULVOVAGINITIS: ICD-10-CM

## 2024-11-04 PROCEDURE — 3074F SYST BP LT 130 MM HG: CPT | Performed by: FAMILY MEDICINE

## 2024-11-04 PROCEDURE — 3008F BODY MASS INDEX DOCD: CPT | Performed by: FAMILY MEDICINE

## 2024-11-04 PROCEDURE — 90471 IMMUNIZATION ADMIN: CPT | Performed by: FAMILY MEDICINE

## 2024-11-04 PROCEDURE — 3078F DIAST BP <80 MM HG: CPT | Performed by: FAMILY MEDICINE

## 2024-11-04 PROCEDURE — 99214 OFFICE O/P EST MOD 30 MIN: CPT | Performed by: FAMILY MEDICINE

## 2024-11-04 PROCEDURE — 90656 IIV3 VACC NO PRSV 0.5 ML IM: CPT | Performed by: FAMILY MEDICINE

## 2024-11-04 RX ORDER — HYDROCORTISONE 25 MG/G
CREAM TOPICAL
COMMUNITY
Start: 2024-09-16

## 2024-11-04 RX ORDER — ACYCLOVIR 400 MG/1
400 TABLET ORAL 3 TIMES DAILY
COMMUNITY
Start: 2024-09-19 | End: 2024-11-04

## 2024-11-04 RX ORDER — VALACYCLOVIR HYDROCHLORIDE 500 MG/1
500 TABLET, FILM COATED ORAL DAILY
Qty: 90 TABLET | Refills: 1 | Status: SHIPPED | OUTPATIENT
Start: 2024-11-04

## 2024-11-04 NOTE — PROGRESS NOTES
Chief Complaint:  Chief Complaint   Patient presents with    Follow - Up     Medical repot fillout    Immunization/Injection     Flu shot       HPI:  This is a 39 year old female patient presenting for Follow - Up (Medical repot fillout) and Immunization/Injection (Flu shot)    L nelson's cyst which causes pain devon when she is walking for longer distances.  Previously was a runner but is frustrated she has not been able to return to exercising.  Had US which proved it was a Baker's cyst.     Recently had vaginal lump with discharge.  Was seen in urgent care and had a swab that was positive for HSV.  This is a new diagnosis.   has no known signs or symptoms of this which has been difficult.     Health Maintenance:  Health Maintenance   Topic Date Due    Influenza Vaccine (1) 10/01/2024    Pap Smear  2025    Annual Physical  2025    Mammogram  2025    DTaP,Tdap,and Td Vaccines (3 - Td or Tdap) 2031    Annual Depression Screening  Completed    COVID-19 Vaccine  Completed    Pneumococcal Vaccine: Birth to 64yrs  Aged Out       ROS: Review of systems performed and negative unless stated in HPI.    Past medical, family and social history reviewed and listed as below.    HISTORY:  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 carrier      Past Surgical History:   Procedure Laterality Date          Needle biopsy right       benign u/s guided bx-fibroadenoma    [place] teeth removed        Family History   Problem Relation Age of Onset    Diabetes Father     Lipids Father     Cancer Mother         non hodgkin lymphoma dx age 60s    Lipids Mother     No Known Problems Son     Stroke Maternal Grandmother     No Known Problems Maternal Grandfather     No Known  Problems Paternal Grandmother     No Known Problems Paternal Grandfather     Asthma Brother     Cancer Brother 35        esophageal and liver cancer    No Known Problems Sister     No Known Problems Sister     No Known Problems Sister     Heart Attack Neg     Prostate Cancer Neg     Breast Cancer Neg     Ovarian Cancer Neg     Uterine Cancer Neg     Pancreatic Cancer Neg     Colon Cancer Neg       Social History     Socioeconomic History    Marital status:     Number of children: 1    Years of education: 16   Tobacco Use    Smoking status: Former     Current packs/day: 0.00     Types: Cigarettes     Start date: 3/2/2006     Quit date: 3/2/2014     Years since quitting: 10.6    Smokeless tobacco: Never   Vaping Use    Vaping status: Never Used   Substance and Sexual Activity    Alcohol use: Not Currently     Alcohol/week: 4.0 standard drinks of alcohol    Drug use: Not Currently     Comment: last used in end of feb/march    Sexual activity: Yes     Partners: Male     Birth control/protection: Paragard     Comment: paragard 10/12/23   Other Topics Concern    Caffeine Concern No    Stress Concern No    Weight Concern No    Special Diet No    Exercise Yes    Seat Belt Yes    Self-Exams Yes        Current Outpatient Medications   Medication Sig Dispense Refill    hydrocortisone 2.5 % External Cream APPLY 1 APPLICATION 2 TIMES PER DAY FOR 10 DAYS      valACYclovir 500 MG Oral Tab Take 1 tablet (500 mg total) by mouth daily. 90 tablet 1    Rizatriptan Benzoate 5 MG Oral Tab Take 1 tablet (5 mg total) by mouth as needed for Migraine. 30 tablet 0     Allergies:  Allergies[1]    EXAM:  Vitals:    11/04/24 1529   BP: 118/78   BP Location: Left arm   Pulse: 89   Resp: 16   SpO2: 98%   Weight: 126 lb 9.6 oz (57.4 kg)   Height: 5' 3.86\" (1.622 m)     GENERAL: vitals reviewed and listed above, alert, oriented, appears well hydrated and in no acute distress  HEENT: atraumatic, conjunctiva clear, no obvious abnormalities on  inspection   LUNGS: clear to auscultation bilaterally, no wheezes, rales or rhonchi, good air movement  CV: HRRR, no murmurs, no peripheral edema   EXTREMITIES: warm and well perfused  PSYCH: pleasant and cooperative, no obvious depression or anxiety    ASSESSMENT AND PLAN:  Discussed the following assessment   Problem List Items Addressed This Visit    None  Visit Diagnoses       Popliteal cyst, left    -  Primary    Relevant Orders    Ortho Referral - In Network    Herpes simplex vulvovaginitis        Relevant Medications    valACYclovir 500 MG Oral Tab            Advised the following:  Anthony was seen today for follow - up and immunization/injection.    Diagnoses and all orders for this visit:    Popliteal cyst, left  -     Ortho Referral - In Network    Herpes simplex vulvovaginitis  -     Will plan for prophylaxis with valACYclovir 500 MG Oral Tab; Take 1 tablet (500 mg total) by mouth daily.  Discussed diagnosis, signs and symptoms of an outbreak.    Other orders  -     Fluzone trivalent vaccine, PF 0.5mL, 6mo+ (87404)    RTC in 6 months for ANAID Mccormack DO  11/4/2024 3:43 PM  Family Medicine    Note to Patient  The 21st Century Cures Act makes medical notes like these available to patients in the interest of transparency. However, be advised this is a medical document and is intended as [id number] communication; it is written in medical language and may appear blunt, direct, or contain abbreviations or verbiage that are unfamiliar. Medical documents are intended to carry relevant information, facts as evident, and the clinical opinion of the practitioner.     This report has been produced using speech recognition software, and may contain errors related to grammar, punctuation, spelling, words or phrases unrecognized or not translated appropriately to text; these errors may be referred to the dictating provider for further clarification and/or addendum as needed.       [1]   Allergies  Allergen  Reactions    Benzalkonium Chloride HIVES, ITCHING, PAIN and SWELLING    Neosporin Wound Cleanser HIVES, ITCHING, PAIN and SWELLING    Neosporin [Bacitracin-Polymyxin B] HIVES    Dander ITCHING

## 2024-11-07 ENCOUNTER — TELEPHONE (OUTPATIENT)
Dept: FAMILY MEDICINE CLINIC | Facility: CLINIC | Age: 39
End: 2024-11-07

## 2024-11-07 NOTE — TELEPHONE ENCOUNTER
Labs reviewed, CBC, CMP, lipids-  no significant or concerning abnormalities. Sent to abstract and scan.

## 2024-11-27 ENCOUNTER — OFFICE VISIT (OUTPATIENT)
Dept: FAMILY MEDICINE CLINIC | Facility: CLINIC | Age: 39
End: 2024-11-27
Payer: COMMERCIAL

## 2024-11-27 VITALS
RESPIRATION RATE: 16 BRPM | WEIGHT: 130 LBS | HEIGHT: 64 IN | TEMPERATURE: 98 F | DIASTOLIC BLOOD PRESSURE: 82 MMHG | HEART RATE: 64 BPM | BODY MASS INDEX: 22.2 KG/M2 | SYSTOLIC BLOOD PRESSURE: 122 MMHG | OXYGEN SATURATION: 97 %

## 2024-11-27 DIAGNOSIS — J02.9 SORE THROAT: Primary | ICD-10-CM

## 2024-11-27 DIAGNOSIS — J06.9 VIRAL URI: ICD-10-CM

## 2024-11-27 LAB
CONTROL LINE PRESENT WITH A CLEAR BACKGROUND (YES/NO): YES YES/NO
KIT LOT #: NORMAL NUMERIC

## 2024-11-27 PROCEDURE — 87880 STREP A ASSAY W/OPTIC: CPT | Performed by: NURSE PRACTITIONER

## 2024-11-27 PROCEDURE — 3008F BODY MASS INDEX DOCD: CPT | Performed by: NURSE PRACTITIONER

## 2024-11-27 PROCEDURE — 99213 OFFICE O/P EST LOW 20 MIN: CPT | Performed by: NURSE PRACTITIONER

## 2024-11-27 PROCEDURE — 3079F DIAST BP 80-89 MM HG: CPT | Performed by: NURSE PRACTITIONER

## 2024-11-27 PROCEDURE — 3074F SYST BP LT 130 MM HG: CPT | Performed by: NURSE PRACTITIONER

## 2024-11-27 NOTE — PROGRESS NOTES
HPI:   Anthony Troy is a 39 year old female who presents with ill symptoms for  4  days. Patient reports cough, sore throat, headache, congestion, mildly loose stools, today with eye discharge/crusting and itching right eye worse than left. Has tried OTC meds with not much relief. Son is also sick, works at  with child.    Current Outpatient Medications   Medication Sig Dispense Refill    hydrocortisone 2.5 % External Cream APPLY 1 APPLICATION 2 TIMES PER DAY FOR 10 DAYS      valACYclovir 500 MG Oral Tab Take 1 tablet (500 mg total) by mouth daily. 90 tablet 1    Rizatriptan Benzoate 5 MG Oral Tab Take 1 tablet (5 mg total) by mouth as needed for Migraine. 30 tablet 0     No current facility-administered medications for this visit.      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 carrier      Past Surgical History:   Procedure Laterality Date          Needle biopsy right       benign u/s guided bx-fibroadenoma    [place] teeth removed        Family History   Problem Relation Age of Onset    Diabetes Father     Lipids Father     Cancer Mother         non hodgkin lymphoma dx age 60s    Lipids Mother     No Known Problems Son     Stroke Maternal Grandmother     No Known Problems Maternal Grandfather     No Known Problems Paternal Grandmother     No Known Problems Paternal Grandfather     Asthma Brother     Cancer Brother 35        esophageal and liver cancer    No Known Problems Sister     No Known Problems Sister     No Known Problems Sister     Heart Attack Neg     Prostate Cancer Neg     Breast Cancer Neg     Ovarian Cancer Neg     Uterine Cancer Neg     Pancreatic Cancer Neg     Colon Cancer Neg       Social History     Socioeconomic History    Marital status:      Number of children: 1    Years of education: 16   Tobacco Use    Smoking status: Former     Current packs/day: 0.00     Types: Cigarettes     Start date: 3/2/2006     Quit date: 3/2/2014     Years since quitting: 10.7    Smokeless tobacco: Never   Vaping Use    Vaping status: Never Used   Substance and Sexual Activity    Alcohol use: Not Currently     Alcohol/week: 4.0 standard drinks of alcohol    Drug use: Not Currently     Comment: last used in end of feb/march    Sexual activity: Yes     Partners: Male     Birth control/protection: Paragard     Comment: paragard 10/12/23   Other Topics Concern    Caffeine Concern No    Stress Concern No    Weight Concern No    Special Diet No    Exercise Yes    Seat Belt Yes    Self-Exams Yes         REVIEW OF SYSTEMS:   GENERAL: feels well otherwise  HEENT: congested, as above in HPI  LUNGS: denies shortness of breath with exertion  CARDIOVASCULAR: denies chest pain on exertion  GI: no nausea or abdominal pain, appetite down  NEURO: admits to headaches    EXAM:   /82   Pulse 64   Temp 98.1 °F (36.7 °C)   Resp 16   Ht 5' 4\" (1.626 m)   Wt 130 lb (59 kg)   LMP 10/25/2024 (Approximate)   SpO2 97%   BMI 22.31 kg/m²   GENERAL: well developed, well nourished,in no apparent distress, appears congested but non toxic appearing  HEENT: atraumatic, normocephalic,ears full and clear, nares with positive congestion, throat pink with PND noted. Uvuvla midline.  No sinus tenderness with palpation. PERRLA, mild redness bilaterally, no discharge, no photophobia  NECK: supple,no adenopathy  LUNGS: clear to auscultation, breathing is non labored  CARDIO: RRR without murmur  Results for orders placed or performed in visit on 11/27/24   Strep A Assay W/Optic    Collection Time: 11/27/24 10:40 AM   Result Value Ref Range    Strep Grp A Screen neg Negative    Control Line Present with a clear background (yes/no) yes Yes/No    Kit Lot # [id number] Numeric    Kit Expiration Date  11/4/25 Date         ASSESSMENT AND PLAN:    PLAN:Anthony was seen today for cold.    Diagnoses and all orders for this visit:    Sore throat  -     Strep A Assay W/Optic    Viral URI      Comfort care advised. Self care discussed. Medication use and risk/benefit discussed. Patient is advised to follow up with PCP if not improving with treatment plan or seek immediate care if symptoms worsen.  The patient indicates understanding of these issues and agrees to the plan.  There are no Patient Instructions on file for this visit.

## 2024-11-27 NOTE — PATIENT INSTRUCTIONS
Use eye moisturizer for eye itching/discharge until symptoms have improved over the next two days.  Warm pack to eyes for removal of mucus, cool pack for itching.  Salt water gargles (1 tsp. Salt in 6 oz lukewarm water), use several times daily to help remove post nasal drainage and soothe throat.  Saline nasal spray such as Ocean or Simply Saline to nostrils 2-3 times daily to help soothe tissues and keep mucus thin.  Hydrate! (cold or hot based on comfort). Drink lots of water or other non dehydrating liquids to help with illness.   May use humidifier in bedroom at night to help with congestion. Hot steamy showers can loosen congestion and help cough. Chin's vapor rub to chest can quiet cough.  Hand washing-use hand  or wash hands frequently, cover your cough or sneeze, do not share towels or drinks with others.  May use Tylenol or Ibuprofen over the counter for pain/comfort if not contraindicated  Follow up in 10-14 days after illness started with primary care if symptoms not complete resolved or sooner if worsening symptoms. Seek immediate care if inability to swallow or breathe or if symptoms improve greatly then worsen again.

## 2024-12-19 VITALS
TEMPERATURE: 98 F | HEART RATE: 67 BPM | SYSTOLIC BLOOD PRESSURE: 129 MMHG | RESPIRATION RATE: 17 BRPM | OXYGEN SATURATION: 95 % | DIASTOLIC BLOOD PRESSURE: 79 MMHG

## 2024-12-19 PROCEDURE — 99283 EMERGENCY DEPT VISIT LOW MDM: CPT

## 2024-12-20 ENCOUNTER — HOSPITAL ENCOUNTER (EMERGENCY)
Facility: HOSPITAL | Age: 39
Discharge: HOME | End: 2024-12-20
Attending: EMERGENCY MEDICINE
Payer: COMMERCIAL

## 2024-12-20 DIAGNOSIS — S01.312A LACERATION OF LEFT EAR CANAL, INITIAL ENCOUNTER: Primary | ICD-10-CM

## 2024-12-20 NOTE — ED INITIAL ASSESSMENT (HPI)
Pt here with c/o left ear bleeding after son pushed a Q tip into her ear. Denies any dizziness/n/v. Pt states it hurt at the time but does not currently hurt. Bleeding controlled

## 2024-12-20 NOTE — ED PROVIDER NOTES
Patient Seen in: [facility] Emergency Department      History     Chief Complaint   Patient presents with    FB in Ear     Stated Complaint: left ear bleeding    Subjective:   HPI      Patient is a 39-year-old female presents to ED for evaluation of bleeding from the left ear.  Patient states her child stuck a Q-tip in her ear.  She states this caused bleeding.  She had pain in her left ear but does not have any pain now.  Not on blood thinners.  Denies foreign body sensation in the left ear.  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                Physical Exam     ED Triage Vitals [244]   /79   Pulse 67   Resp 17   Temp 97.7 °F (36.5 °C)   Temp src Temporal   SpO2 95 %   O2 Device None (Room air)       Current Vitals:   Vital Signs  BP: 129/79  Pulse: 67  Resp: 17  Temp: 97.7 °F (36.5 °C)  Temp src: Temporal    Oxygen Therapy  SpO2: 95 %  O2 Device: None (Room air)        Physical Exam  Constitutional: Well-appearing in no acute distress  HEENT: Right TM clear.  Patient has blood noted in the left external ear canal.  I can visualize part of the left TM without evidence for obvious TM rupture.  No obvious foreign bodies noted in the left external ear canal    ED Course   Labs Reviewed - No data to display                MDM      Patient is a 39-year-old female presents to ED for evaluation of bleeding from her left ear.  Differential TM  perforation, abrasion to left external ear canal.  No sign of foreign body.  No obvious TM rupture.  Left ear canal was irrigated.  Still having some mild bleeding.  A wick was placed in the left ear canal to help with the bleeding.  Recommend ENT follow-up.  Recommend wick removal in 48 hours at home.    Patient was screened and evaluated during this visit.   As a treating physician attending to the patient, I determined, within reasonable clinical confidence and prior to discharge, that an emergency medical condition was not or was no longer present.  There was no indication for further evaluation, treatment or admission on an emergency basis.  Comprehensive verbal and written discharge and follow-up instructions were provided to help prevent relapse or worsening.  Patient was instructed to follow-up with her primary care provider for further evaluation and treatment, but to return immediately to the ER for worsening, concerning, new, changing or persisting symptoms.  I discussed the case with the patient and they had no questions, complaints, or concerns.  Patient felt comfortable going home.            MDM    Disposition and Plan     Clinical Impression:  1. Laceration of left ear canal, initial encounter         Disposition:  Discharge  12/20/2024  2:17 am    Follow-up:  Adrian Luis MD  [address] [facility] [address]  [phone]    Follow up in 3 day(s)            Medications Prescribed:  Discharge Medication List as of 12/20/2024  2:18 AM              Supplementary Documentation:

## 2024-12-23 ENCOUNTER — TELEPHONE (OUTPATIENT)
Facility: LOCATION | Age: 39
End: 2024-12-23

## 2024-12-23 ENCOUNTER — OFFICE VISIT (OUTPATIENT)
Facility: LOCATION | Age: 39
End: 2024-12-23
Payer: COMMERCIAL

## 2024-12-23 ENCOUNTER — TELEPHONE (OUTPATIENT)
Dept: FAMILY MEDICINE CLINIC | Facility: CLINIC | Age: 39
End: 2024-12-23

## 2024-12-23 DIAGNOSIS — S01.312D LACERATION OF LEFT EARLOBE, SUBSEQUENT ENCOUNTER: Primary | ICD-10-CM

## 2024-12-23 PROCEDURE — 92504 EAR MICROSCOPY EXAMINATION: CPT | Performed by: STUDENT IN AN ORGANIZED HEALTH CARE EDUCATION/TRAINING PROGRAM

## 2024-12-23 PROCEDURE — 99202 OFFICE O/P NEW SF 15 MIN: CPT | Performed by: STUDENT IN AN ORGANIZED HEALTH CARE EDUCATION/TRAINING PROGRAM

## 2024-12-23 NOTE — PROGRESS NOTES
Anthony Troy is a 39 year old female.   Chief Complaint   Patient presents with    Ear Problem     HPI:   39 year old female presenting for evaluation of a left ear laceration.  She let her child pretend to clean her ears with a Q-tip on 12/19 but he traumatically injured her ear canal.  She was evaluated in the ED on 12/20 and her TM was noted to be intact.  An otowick was placed and she was advised to follow-up with ENT.  She reports that pain has improved.  Denies pain and bleeding has resolved.    Current Outpatient Medications   Medication Sig Dispense Refill    dexamethasone 0.1 % Ophthalmic Suspension Place 2 drops into the left eye 2 (two) times daily. 5 mL 0    hydrocortisone 2.5 % External Cream APPLY 1 APPLICATION 2 TIMES PER DAY FOR 10 DAYS      valACYclovir 500 MG Oral Tab Take 1 tablet (500 mg total) by mouth daily. 90 tablet 1    Rizatriptan Benzoate 5 MG Oral Tab Take 1 tablet (5 mg total) by mouth as needed for Migraine. 30 tablet 0      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 carrier      Social History:  Social History     Socioeconomic History    Marital status:     Number of children: 1    Years of education: 16   Tobacco Use    Smoking status: Former     Current packs/day: 0.00     Types: Cigarettes     Start date: 3/2/2006     Quit date: 3/2/2014     Years since quitting: 10.8    Smokeless tobacco: Never   Vaping Use    Vaping status: Never Used   Substance and Sexual Activity    Alcohol use: Not Currently     Alcohol/week: 4.0 standard drinks of alcohol    Drug use: Not Currently     Comment: last used in end of feb/march    Sexual activity: Yes     Partners: Male     Birth control/protection: Paragard     Comment: paragard 10/12/23   Other Topics  Concern    Caffeine Concern No    Stress Concern No    Weight Concern No    Special Diet No    Exercise Yes    Seat Belt Yes    Self-Exams Yes      Past Surgical History:   Procedure Laterality Date          Needle biopsy right       benign u/s guided bx-fibroadenoma    [place] teeth removed           REVIEW OF SYSTEMS:   GENERAL HEALTH: feels well otherwise  GENERAL : denies fever, chills, sweats, weight loss, weight gain  SKIN: denies any unusual skin lesions or rashes  RESPIRATORY: denies shortness of breath with exertion  NEURO: denies headaches    EXAM:   LMP 10/25/2024 (Approximate)     System Findings Details   Constitutional  Overall appearance - Normal.   Head/Face  Facial features -- Normal. Skull - Normal.   Eyes  Sclera white, pupils equal and round, EOMI   Ears  A microscope was used to perform the examination. External ears normal in appearance, EACs patent, no otowick seen, left EAC with dried blood but no obvious laceration, TMs intact bilaterally, no evidence of middle ear effusion   Nose  External nose normal in appearance, nares patent   Oral cavity  Lips normal in appearance   Neck  Trachea midline   Neurological  Memory - Normal. Cranial nerves - Cranial nerves II through XII grossly intact.       ASSESSMENT AND PLAN:   39 year old female presenting for evaluation of a left ear laceration.      -Dexamethasone BID x 5 days  -TM intact, no obvious laceration visualized  -Follow up with PCP    The patient indicates understanding of these issues and agrees to the plan.

## 2024-12-23 NOTE — TELEPHONE ENCOUNTER
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

## 2024-12-23 NOTE — TELEPHONE ENCOUNTER
Pt left a voicemail over the weekend requesting an appointment with ENT today. Pt was in the ED on 24, for a laceration of left ear canal. Pt was advised to follow up with ENT in three days. Please advise. Below is pasted the ER note.         Chief Complaint   Patient presents with    FB in Ear      Stated Complaint: left ear bleeding        Subjective:  HPI        Patient is a 39-year-old female presents to ED for evaluation of bleeding from the left ear.  Patient states her child stuck a Q-tip in her ear.  She states this caused bleeding.  She had pain in her left ear but does not have any pain now.  Not on blood thinners.  Denies foreign body sensation in the left ear.  She was having no ear symptoms or problems before child stuck the Q-tip in her ear        Objective:      Past Medical History:    Allergic rhinitis    Anemia    Anxiety    Depression     no meds at this time    Migraine without aura    Screening for genetic disease carrier status     FH-related conditions carrier, 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 deficiency carrier                         Past Surgical History:   Procedure Laterality Date            Needle biopsy right          benign u/s guided bx-fibroadenoma    [place] teeth removed                          No pertinent social history.                      Physical Exam          ED Triage Vitals [24 2134]   /79   Pulse 67   Resp 17   Temp 97.7 °F (36.5 °C)   Temp src Temporal   SpO2 95 %   O2 Device None (Room air)         Current Vitals:   Vital Signs  BP: 129/79  Pulse: 67  Resp: 17  Temp: 97.7 °F (36.5 °C)  Temp src: Temporal     Oxygen Therapy  SpO2: 95 %  O2 Device: None (Room air)           Physical Exam  Constitutional: Well-appearing in no acute distress  HEENT: Right TM clear.  Patient has blood noted in the left external ear canal.  I can visualize part of the left TM without evidence for obvious TM rupture.  No obvious foreign bodies  noted in the left external ear canal     ED Course   Labs Reviewed - No data to display                    [facility]   Patient is a 39-year-old female presents to ED for evaluation of bleeding from her left ear.  Differential TM perforation, abrasion to left external ear canal.  No sign of foreign body.  No obvious TM rupture.  Left ear canal was irrigated.  Still having some mild bleeding.  A wick was placed in the left ear canal to help with the bleeding.  Recommend ENT follow-up.  Recommend wick removal in 48 hours at home.     Patient was screened and evaluated during this visit.   As a treating physician attending to the patient, I determined, within reasonable clinical confidence and prior to discharge, that an emergency medical condition was not or was no longer present.  There was no indication for further evaluation, treatment or admission on an emergency basis.  Comprehensive verbal and written discharge and follow-up instructions were provided to help prevent relapse or worsening.  Patient was instructed to follow-up with her primary care provider for further evaluation and treatment, but to return immediately to the ER for worsening, concerning, new, changing or persisting symptoms.  I discussed the case with the patient and they had no questions, complaints, or concerns.  Patient felt comfortable going home.                 ASHTYN

## 2024-12-26 ENCOUNTER — TELEPHONE (OUTPATIENT)
Facility: CLINIC | Age: 39
End: 2024-12-26

## 2024-12-26 DIAGNOSIS — M25.562 LEFT KNEE PAIN, UNSPECIFIED CHRONICITY: Primary | ICD-10-CM

## 2024-12-26 NOTE — TELEPHONE ENCOUNTER
New pt appt for left knee imaging avail in epic  Future Appointments  12/30/2024 3:00 PM    Nadeem Rodriguez MD             [facility]        [facility] [address]  1/15/2025  4:30 PM    Pasha Sneed MD          [facility]          [facility]

## 2024-12-26 NOTE — TELEPHONE ENCOUNTER
Per chart, patient saw Dr. Díaz 10/4/23 for left knee pain  XR done 10/3/23   CONCLUSION:     Negative for fracture or malalignment.  Normal mineralization.  Joint spaces are preserved.  No patellar subluxation.  No joint effusion or focal soft tissue swelling.  --Per OV 10/4/23 w/Arjun: \"I elected to order an MRI scan of the knee to further characterize internal derangement, I will plan to follow-up with the patient after completion of the MRI scan\".  MRI was ordered as above. No MRI results in chart, no further follow up.     Therefore, weightbearing knee xrays ordered per protocol for visit with Dr. Rodriguez. Caternat message sent advising patient to arrive 15-20 minutes early for xrays prior to appointment.     Future Appointments   Date Time Provider Department Center   12/30/2024  2:45 PM PF [facility] RM1 PF XRAY [place]   12/30/2024  3:00 PM Nadeem Rodriguez MD [facility] [address]   1/15/2025  4:30 PM Pasha Sneed MD [facility]

## 2024-12-30 ENCOUNTER — HOSPITAL ENCOUNTER (OUTPATIENT)
Dept: GENERAL RADIOLOGY | Age: 39
Discharge: HOME | End: 2024-12-30
Attending: STUDENT IN AN ORGANIZED HEALTH CARE EDUCATION/TRAINING PROGRAM
Payer: COMMERCIAL

## 2024-12-30 ENCOUNTER — OFFICE VISIT (OUTPATIENT)
Facility: CLINIC | Age: 39
End: 2024-12-30
Payer: COMMERCIAL

## 2024-12-30 VITALS — HEIGHT: 64 IN | WEIGHT: 130 LBS | BODY MASS INDEX: 22.2 KG/M2

## 2024-12-30 DIAGNOSIS — M25.562 LEFT KNEE PAIN, UNSPECIFIED CHRONICITY: ICD-10-CM

## 2024-12-30 DIAGNOSIS — M71.22 BAKER'S CYST OF KNEE, LEFT: Primary | ICD-10-CM

## 2024-12-30 PROCEDURE — 73564 X-RAY EXAM KNEE 4 OR MORE: CPT | Performed by: STUDENT IN AN ORGANIZED HEALTH CARE EDUCATION/TRAINING PROGRAM

## 2024-12-30 NOTE — PROGRESS NOTES
Orthopaedic Surgery  [id number] [place] [id number]   [phone]        Chief Complaint:  Swelling in posterior knee    History of Present Illness:   Anthony Troy is a 39 year old female seen in clinic today as new to me but established to the practice for evaluation of their left knee. She has reported swelling in the posterior knee for the last 1-2 years. It causes discomfort with range of motion. No injuries that she can recall. Otherwise no pain with increased walking or stairs.    Prior imaging studies have included XRs and ultrasound. This revealed a popliteal cyst. Patient was previously evaluated by Dr Díaz for the same complaint. He ordered an MRI of the knee but this was denied by insurance. Treatment so far has consisted of none.    Activities of Daily Life:  Used to be fairly active with running  Assistive devices used: none        PMH/PSH/Family History/Social History/Meds/Allergies:   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 carrier        Past Surgical History:   Procedure Laterality Date          Needle biopsy right       benign u/s guided bx-fibroadenoma    [place] teeth removed          Family History   Problem Relation Age of Onset    Diabetes Father     Lipids Father     Cancer Mother         non hodgkin lymphoma dx age 60s    Lipids Mother     No Known Problems Son     Stroke Maternal Grandmother     No Known Problems Maternal Grandfather     No Known Problems Paternal Grandmother     No Known Problems Paternal Grandfather     Asthma Brother     Cancer Brother 35        esophageal and liver cancer    No Known Problems Sister     No Known Problems Sister     No Known Problems Sister     Heart Attack Neg     Prostate Cancer Neg      Breast Cancer Neg     Ovarian Cancer Neg     Uterine Cancer Neg     Pancreatic Cancer Neg     Colon Cancer Neg         Social History     Socioeconomic History    Marital status:      Spouse name: Not on file    Number of children: 1    Years of education: 16    Highest education level: Not on file   Occupational History    Not on file   Tobacco Use    Smoking status: Former     Current packs/day: 0.00     Types: Cigarettes     Start date: 3/2/2006     Quit date: 3/2/2014     Years since quitting: 10.8    Smokeless tobacco: Never   Vaping Use    Vaping status: Never Used   Substance and Sexual Activity    Alcohol use: Not Currently     Alcohol/week: 4.0 standard drinks of alcohol    Drug use: Not Currently     Comment: last used in end of feb/march    Sexual activity: Yes     Partners: Male     Birth control/protection: Paragard     Comment: paragard 10/12/23   Other Topics Concern     Service Not Asked    Blood Transfusions Not Asked    Caffeine Concern No    Occupational Exposure Not Asked    Hobby Hazards Not Asked    Sleep Concern Not Asked    Stress Concern No    Weight Concern No    Special Diet No    Back Care Not Asked    Exercise Yes    Bike Helmet Not Asked    Seat Belt Yes    Self-Exams Yes   Social History Narrative    Not on file     Social Drivers of Health     Financial Resource Strain: Not on file   Food Insecurity: Not on file   Transportation Needs: Not on file   Physical Activity: Not on file   Stress: Not on file   Social Connections: Not on file   Housing Stability: Not on file        Current Outpatient Medications   Medication Instructions    dexamethasone 0.1 % Ophthalmic Suspension 2 drops, Left Eye, 2 times daily    hydrocortisone 2.5 % External Cream APPLY 1 APPLICATION 2 TIMES PER DAY FOR 10 DAYS    Rizatriptan Benzoate (MAXALT) 5 mg, Oral, As needed    valACYclovir (VALTREX) 500 mg, Oral, Daily        Allergies[1]       Physical Exam:   Vitals:    12/30/24 1522    Weight: 130 lb (59 kg)   Height: 5' 4\" (1.626 m)     Estimated body mass index is 22.31 kg/m² as calculated from the following:    Height as of this encounter: 5' 4\" (1.626 m).    Weight as of this encounter: 130 lb (59 kg).    Constitutional: No acute distress, well nourished.  Eyes: Anicteric sclera, pink conjunctiva  Ears, Nose, Mouth and Throat: Normocephalic, atraumatic, moist mucous membranes  Cardiovascular: No pitting edema or varicosities in the lower extremities. Maneuvers demonstrate a negative Rubin's sign. No palpable cords in calf noted.   Respiratory: No respiratory distress, normal respiratory rhythm and effort   Neurological:  Oriented to person, place, and time.  Psychological:  Appropriate mood and affect.    Comprehensive Left Knee Exam:      Inspection: No erythema, ecchymoses, or wounds. No rash. No previous incisions noted. No effusion. No appreciable quad atrophy  Alignment: neutral  ROM: 0 -130 degrees, flexion contracture: 0 degrees, quad lag: no  Stability: A/P stress: stable, firm endpoint, M/L stress: stable, firm endpoint at 0 and 30 degrees flexion  Extensor mechanism is intact  Pain or crepitus with ROM?: No  Palpable cyst in popliteal fossa. Non-tender at: medial joint line, lateral joint line, patella, along the distal IT band toward Gerdy's tubercle, medial knee about the pes anserinus, patellar tendon, quadriceps tendon  Strength: Intact 5/5 strength SLR and TA/GS/FHL/EHL  Sensation: Grossly intact to light touch over SPN/DPN/Saph/Sural/Tibial nerve distributions  Vasc: Warm perfused extremity        Imaging:   Weightbearing XRs of the left knee were obtained with AP, PA Flex, sunrise, and lateral views    They show no degenerative changes of the knee. No fracture or dislocation seen    I personally reviewed and interpreted the radiographs.      Assessment:     ICD-10-CM    1. Baker's cyst of knee, left  M71.22              Plan:   Patient with cyst in the popliteal fossa in the  setting of minimal arthritis  It is causing discomfort with ROM  She is interested in getting this aspirated  I have referred her to see my partner Dr Feliciano to have this evaluated       Nadeem Rodriguez MD  Adult Hip and Knee Reconstruction    Department of Orthopaedic Surgery  [facility]     [address] [address]  [address] [place] [id number]     t: [phone]  f: [phone]       [email or web address]         [1]   Allergies  Allergen Reactions    Benzalkonium Chloride HIVES, ITCHING, PAIN and SWELLING    Neosporin Wound Cleanser HIVES, ITCHING, PAIN and SWELLING    Neosporin [Bacitracin-Polymyxin B] HIVES    Dander ITCHING

## 2025-01-03 ENCOUNTER — OFFICE VISIT (OUTPATIENT)
Facility: CLINIC | Age: 40
End: 2025-01-03
Payer: COMMERCIAL

## 2025-01-03 VITALS — WEIGHT: 130 LBS | HEIGHT: 64 IN | BODY MASS INDEX: 22.2 KG/M2

## 2025-01-03 DIAGNOSIS — M79.89 SOFT TISSUE MASS: ICD-10-CM

## 2025-01-03 DIAGNOSIS — M71.22 BAKER'S CYST OF KNEE, LEFT: Primary | ICD-10-CM

## 2025-01-03 PROCEDURE — 3008F BODY MASS INDEX DOCD: CPT | Performed by: FAMILY MEDICINE

## 2025-01-03 PROCEDURE — 20611 DRAIN/INJ JOINT/BURSA W/US: CPT | Performed by: FAMILY MEDICINE

## 2025-01-03 PROCEDURE — 99214 OFFICE O/P EST MOD 30 MIN: CPT | Performed by: FAMILY MEDICINE

## 2025-01-03 RX ORDER — KETOROLAC TROMETHAMINE 30 MG/ML
30 INJECTION, SOLUTION INTRAMUSCULAR; INTRAVENOUS ONCE
Status: COMPLETED | OUTPATIENT
Start: 2025-01-03 | End: 2025-01-03

## 2025-01-03 RX ORDER — TRIAMCINOLONE ACETONIDE 40 MG/ML
40 INJECTION, SUSPENSION INTRA-ARTICULAR; INTRAMUSCULAR ONCE
Status: COMPLETED | OUTPATIENT
Start: 2025-01-03 | End: 2025-01-03

## 2025-01-03 RX ADMIN — KETOROLAC TROMETHAMINE 30 MG: 30 INJECTION, SOLUTION INTRAMUSCULAR; INTRAVENOUS at 09:25:00

## 2025-01-03 RX ADMIN — TRIAMCINOLONE ACETONIDE 40 MG: 40 INJECTION, SUSPENSION INTRA-ARTICULAR; INTRAMUSCULAR at 09:25:00

## 2025-01-03 NOTE — H&P
Sports Medicine Clinic Note    Subjective:    Chief Complaint: Swelling in posterior left knee.    History: 39-year-old female with persistent swelling in the posterior left knee, which causes discomfort with range of motion but no pain with increased walking or stair use. No history of trauma or prior treatment for this condition. Previous imaging studies, including ultrasound and X-rays, identified a popliteal cyst. An MRI was previously denied by insurance. She was referred for evaluation and intervention, expressing interest in aspiration today.    Objective:    Left Knee Examination:    Inspection: No erythema, ecchymoses, wounds, or effusion. Neutral alignment, no visible atrophy.  Palpation: Palpable cyst in the popliteal fossa. Non-tender medial and lateral joint lines, patella, IT band, pes anserinus, patellar tendon, and quadriceps tendon.  Range of Motion: 0-130 degrees without flexion contracture or quad lag. No pain or crepitus with motion.  Stability: Stable to anterior/posterior and medial/lateral stress at 0 and 30 degrees of flexion with firm endpoints.  Strength: 5/5 in SLR, tibialis anterior, gastrocnemius-soleus, flexor hallucis longus, and extensor hallucis longus.  Neurovascular: Sensation intact to SPN, DPN, saphenous, sural, and tibial nerve distributions. Extremity warm and well-perfused.    Diagnostic Tests:    Weight-bearing X-rays of the left knee, reviewed and interpreted, showed no fractures, dislocations, or degenerative changes.    Assessment:    Popliteal cyst in the left knee causing discomfort with range of motion.    Plan:    Procedure: Performed aspiration and corticosteroid injection of the popliteal cyst under sterile conditions today. Patient tolerated the procedure well.  Medications: Recommend NSAIDs as needed for pain relief.  Activity Recommendations: Avoid high-impact activities for the next week to minimize irritation. Gradually resume normal activities as  tolerated.    Follow-Up: Tentatively scheduled in 2-4 weeks to assess response to the intervention. Patient instructed to return earlier if swelling or discomfort persists or worsens.    Ultrasound Guided Procedure Note:    After discussion of the risks and benefits, the patient elected to proceed with an aspiration/injection of a left popliteal cyst under US guidance. Confirmed that the patient does not have history of prior adverse reactions, active infections, or relevant allergies. There was no erythema or warmth, and the skin was clear.    The skin was sterilized with ChloraPrep. An 18 gauge needle was inserted via inferolateral approach utilizing US for needle guidance and placement after local anesthesia was achieved with 2 mL of 1% Lidocaine without Epinephrine. The aspiration was completed without complication, yielding 30 mL of straw colored fluid. Following aspiration the syringe was exchanged and the site was injected with a mixture of 1 mL of triamcinolone 40 mg/mL, 1 mL of ketorolac 30 mg/mL and 1 mL of 1% Lidocaine without Epinephrine. The injection was completed without complication, and a bandage was applied.     Post-Injection Care: The patient tolerated the procedure well. An occlusive bandage was placed over the injection site. Post-injection care instructions provided to the patient. The patient was asked to avoid strenuous activity and continue to rest the area for 24 to 48 hours before resuming regular activities. Patient advised that the area may be more painful for the first 1-2 days. They can use ice or Tylenol for pain as needed.  Patient was instructed to watch for fever, increased swelling, or persistent pain. The patient will call immediately with any signs of infection or allergic reaction.      Bridger Feliciano DO, CAM   Primary Care Sports Medicine

## 2025-01-15 ENCOUNTER — OFFICE VISIT (OUTPATIENT)
Age: 40
End: 2025-01-15
Payer: COMMERCIAL

## 2025-01-15 VITALS
BODY MASS INDEX: 21.57 KG/M2 | HEART RATE: 71 BPM | WEIGHT: 126.38 LBS | OXYGEN SATURATION: 98 % | RESPIRATION RATE: 16 BRPM | HEIGHT: 64 IN | SYSTOLIC BLOOD PRESSURE: 108 MMHG | DIASTOLIC BLOOD PRESSURE: 80 MMHG

## 2025-01-15 DIAGNOSIS — J38.5 LARYNGOSPASM: ICD-10-CM

## 2025-01-15 DIAGNOSIS — T78.3XXA ANGIOEDEMA, INITIAL ENCOUNTER: ICD-10-CM

## 2025-01-15 DIAGNOSIS — E06.9 THYROIDITIS: ICD-10-CM

## 2025-01-15 DIAGNOSIS — Z91.018 FOOD ALLERGY: ICD-10-CM

## 2025-01-15 DIAGNOSIS — R13.10 DYSPHAGIA, UNSPECIFIED TYPE: ICD-10-CM

## 2025-01-15 DIAGNOSIS — T78.2XXA ANAPHYLAXIS, INITIAL ENCOUNTER: Primary | ICD-10-CM

## 2025-01-15 PROCEDURE — 3079F DIAST BP 80-89 MM HG: CPT | Performed by: ALLERGY & IMMUNOLOGY

## 2025-01-15 PROCEDURE — 3074F SYST BP LT 130 MM HG: CPT | Performed by: ALLERGY & IMMUNOLOGY

## 2025-01-15 PROCEDURE — 3008F BODY MASS INDEX DOCD: CPT | Performed by: ALLERGY & IMMUNOLOGY

## 2025-01-15 PROCEDURE — 99205 OFFICE O/P NEW HI 60 MIN: CPT | Performed by: ALLERGY & IMMUNOLOGY

## 2025-01-15 NOTE — PATIENT INSTRUCTIONS
We will send patient for blood test to try to evaluate the possible cause of the patient's throat discomfort, food sticking, choking, emesis, and even drooling with liquid after ingestion of particular foods .    RAST adult food profile, plus RAST to eggs, chicken, potato, tomato, rice, bakers yeast  Serum tryptase, quantitative and functional C1 esterase inhibitor; TSH, thyroid peroxidase antibodies, thyroglobulin antibodies.    Patient will follow-up in our [place] allergy clinic in [facility] in 2 to 4 weeks.    Patient may require consultation with with ENT and GI specialist in the near future.

## 2025-01-15 NOTE — H&P
Anthony Troy is a 39 year old female.    HPI:     Chief Complaint   Patient presents with    New Patient     Patient was referred by ENT for possible food allergies. Reports feeling like throat is closing when eating but not sure what food is triggering it.     The patient is a 39-year-old female who was referred by ENT specialist, Dr. Moriah Washington, for new consult for evaluation of possible anaphylaxis, with associated laryngeal spasm upon eating certain foods.    Patient reports a 4-year history of throat discomfort/swelling sensation; food sticking; choking; occasional emesis; and even drooling at times with liquid ingestion.  She denies associated pruritus or flushing.  She has not noticed angioedema of the eyes lips or tongue with a throat sensation.  She denies any associated rash or urticaria.  She denies associated shortness of breath, chest tightness, cough or wheezing.    Her last episode was 2 weeks ago.  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    Concerning foods include eggs, chicken, potato, tomato, rice, bakers yeast, and dairy-butter.        HISTORY:  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 carrier      Past Surgical History:   Procedure Laterality Date          Needle biopsy right       benign u/s guided bx-fibroadenoma    [place] teeth removed        Family History   Problem Relation Age of Onset    Diabetes Father     Lipids Father     Cancer Mother         non hodgkin lymphoma dx age 60s    Lipids Mother     No Known Problems Son     Stroke Maternal Grandmother     No Known Problems Maternal Grandfather     No Known Problems Paternal Grandmother     No Known Problems Paternal Grandfather      Asthma Brother     Cancer Brother 35        esophageal and liver cancer    No Known Problems Sister     No Known Problems Sister     No Known Problems Sister     Heart Attack Neg     Prostate Cancer Neg     Breast Cancer Neg     Ovarian Cancer Neg     Uterine Cancer Neg     Pancreatic Cancer Neg     Colon Cancer Neg       Social History:   Social History     Socioeconomic History    Marital status:     Number of children: 1    Years of education: 16   Tobacco Use    Smoking status: Former     Current packs/day: 0.00     Types: Cigarettes     Start date: 3/2/2006     Quit date: 3/2/2014     Years since quitting: 10.8     Passive exposure: Never    Smokeless tobacco: Never   Vaping Use    Vaping status: Never Used   Substance and Sexual Activity    Alcohol use: Not Currently     Alcohol/week: 4.0 standard drinks of alcohol    Drug use: Not Currently     Comment: last used in end of feb/march    Sexual activity: Yes     Partners: Male     Birth control/protection: Paragard     Comment: paragard 10/12/23   Other Topics Concern    Caffeine Concern No    Stress Concern No    Weight Concern No    Special Diet No    Exercise Yes    Seat Belt Yes    Self-Exams Yes        Medications (Active prior to today's visit):  Current Outpatient Medications   Medication Sig Dispense Refill    dexamethasone 0.1 % Ophthalmic Suspension Place 2 drops into the left eye 2 (two) times daily. 5 mL 0    hydrocortisone 2.5 % External Cream       valACYclovir 500 MG Oral Tab Take 1 tablet (500 mg total) by mouth daily. 90 tablet 1    Rizatriptan Benzoate 5 MG Oral Tab Take 1 tablet (5 mg total) by mouth as needed for Migraine. 30 tablet 0       Allergies:  Allergies[1]      ROS:   Review of Systems   Constitutional:  Negative for activity change, appetite change, chills, diaphoresis, fatigue, fever and unexpected weight change.   HENT:  Negative for congestion, ear discharge, ear pain, facial swelling, hearing loss, mouth sores, postnasal  drip, rhinorrhea, sinus pressure, sinus pain, sneezing, sore throat, tinnitus, trouble swallowing and voice change.         Patient reports episodes of throat discomfort/swelling sensation; food sticking; choking; occasional emesis; occasional drooling with liquid ingestion.   Eyes:  Negative for pain, discharge, redness and itching.   Respiratory:  Negative for apnea, cough, choking, chest tightness, shortness of breath, wheezing and stridor.    Cardiovascular:  Negative for chest pain and palpitations.   Gastrointestinal:  Negative for abdominal distention, abdominal pain, constipation, diarrhea, nausea and vomiting.   Endocrine: Negative for cold intolerance and heat intolerance.   Musculoskeletal:  Negative for arthralgias, joint swelling and myalgias.   Skin:  Negative for pallor and rash.   Allergic/Immunologic: Positive for food allergies. Negative for environmental allergies and immunocompromised state.   Neurological:  Negative for headaches.   Psychiatric/Behavioral:  Negative for behavioral problems and sleep disturbance.           PHYSICAL EXAM:   Physical Exam        ASSESSMENT   Assessment   Encounter Diagnoses   Name Primary?    Anaphylaxis, initial encounter Yes    Laryngospasm     Food allergy     Thyroiditis     Angioedema, initial encounter     Dysphagia, unspecified type        PLAN     We will send patient for blood test to search for possible causes of the patient's anaphylactic symptoms:    RAST testing to adult food profile, plus eggs, chicken, potato, tomato, rice, bakers yeast.    We will also include blood test to measure serum tryptase, quantitative and functional C1 esterase inhibitor; TSH, thyroid peroxidase antibodies, thyroglobulin antibodies    Patient will follow-up in our [place] allergy clinic in [facility] in 2 to 4 weeks.    If above evaluation is not diagnostic, patient may require consultation with ENT and/or GI specialist.       Orders This Visit:  Orders Placed  This Encounter   Procedures    Adult Food Allergy Prof    Egg White    Egg (Yolk)    Chicken    Allergen, Potato    Tomato    Allergen, Food, Rice    Yeast, Baker'S    Tryptase    Complement C4 and C1 Esterase Serum plus C1 Esterase Inhibitor Function    TSH W Reflex To Free T4    Thyroid Antithyroglobulin AB    Thyroid Peroxidase (TPO) AB       Meds This Visit:  Requested Prescriptions      No prescriptions requested or ordered in this encounter       Imaging & Referrals:  None     1/15/2025  Pasha Sneed MD      If medication samples were provided today, they were provided solely for patient education and training related to self administration of these medications.  Teaching, instruction and sample was provided to the patient by myself.  Teaching included  a review of potential adverse side effects as well as potential efficacy.  Patient's questions were answered in regards to medication administration and dosing and potential side effects. Teaching was provided via the teach back method       [1]   Allergies  Allergen Reactions    Benzalkonium Chloride HIVES, ITCHING, PAIN and SWELLING    Neosporin Wound Cleanser HIVES, ITCHING, PAIN and SWELLING    Neosporin [Bacitracin-Polymyxin B] HIVES    Dander ITCHING

## 2025-01-18 ENCOUNTER — LAB ENCOUNTER (OUTPATIENT)
Dept: LAB | Facility: HOSPITAL | Age: 40
End: 2025-01-18
Attending: ALLERGY & IMMUNOLOGY
Payer: COMMERCIAL

## 2025-01-18 DIAGNOSIS — J38.5 LARYNGOSPASM: ICD-10-CM

## 2025-01-18 DIAGNOSIS — Z11.3 SCREENING EXAMINATION FOR STI: ICD-10-CM

## 2025-01-18 DIAGNOSIS — T78.3XXA ANGIOEDEMA, INITIAL ENCOUNTER: ICD-10-CM

## 2025-01-18 DIAGNOSIS — Z91.018 FOOD ALLERGY: ICD-10-CM

## 2025-01-18 DIAGNOSIS — E06.9 THYROIDITIS: ICD-10-CM

## 2025-01-18 DIAGNOSIS — T78.2XXA ANAPHYLAXIS, INITIAL ENCOUNTER: ICD-10-CM

## 2025-01-18 LAB
HBV SURFACE AG SER-ACNC: <0.1 [IU]/L
HBV SURFACE AG SERPL QL IA: NONREACTIVE
HCV AB SERPL QL IA: NONREACTIVE
T PALLIDUM AB SER QL IA: NONREACTIVE
THYROGLOB SERPL-MCNC: <15 U/ML (ref ?–60)
THYROPEROXIDASE AB SERPL-ACNC: 33 U/ML (ref ?–60)
TSI SER-ACNC: 0.73 UIU/ML (ref 0.55–4.78)

## 2025-01-18 PROCEDURE — 86003 ALLG SPEC IGE CRUDE XTRC EA: CPT

## 2025-01-18 PROCEDURE — 86780 TREPONEMA PALLIDUM: CPT

## 2025-01-18 PROCEDURE — 87389 HIV-1 AG W/HIV-1&-2 AB AG IA: CPT

## 2025-01-18 PROCEDURE — 86376 MICROSOMAL ANTIBODY EACH: CPT

## 2025-01-18 PROCEDURE — 86800 THYROGLOBULIN ANTIBODY: CPT

## 2025-01-18 PROCEDURE — 86803 HEPATITIS C AB TEST: CPT

## 2025-01-18 PROCEDURE — 82785 ASSAY OF IGE: CPT

## 2025-01-18 PROCEDURE — 87340 HEPATITIS B SURFACE AG IA: CPT

## 2025-01-18 PROCEDURE — 84443 ASSAY THYROID STIM HORMONE: CPT

## 2025-01-18 PROCEDURE — 36415 COLL VENOUS BLD VENIPUNCTURE: CPT

## 2025-01-18 PROCEDURE — 86160 COMPLEMENT ANTIGEN: CPT

## 2025-01-18 PROCEDURE — 86161 COMPLEMENT/FUNCTION ACTIVITY: CPT

## 2025-01-18 PROCEDURE — 83520 IMMUNOASSAY QUANT NOS NONAB: CPT

## 2025-01-20 LAB
ALLERGEN BRAZIL NUT: <0.1 KUA/L (ref ?–0.1)
ALMOND IGE QN: <0.1 KUA/L (ref ?–0.1)
BAKER'S YEAST IGE QN: <0.1 KUA/L (ref ?–0.1)
CASHEW NUT IGE QN: <0.1 KUA/L (ref ?–0.1)
CHICKEN MEAT IGE QN: <0.1 KUA/L (ref ?–0.1)
CLAM IGE QN: 0.32 KUA/L (ref ?–0.1)
CODFISH IGE QN: <0.1 KUA/L (ref ?–0.1)
CORN IGE QN: <0.1 KUA/L (ref ?–0.1)
COW MILK IGE QN: 0.13 KUA/L (ref ?–0.1)
EGG WHITE IGE QN: <0.1 KUA/L (ref ?–0.1)
EGG WHITE IGE QN: <0.1 KUA/L (ref ?–0.1)
EGG YOLK IGE QN: <0.1 KUA/L (ref ?–0.1)
GLUTEN IGE QN: <0.1 KUA/L (ref ?–0.1)
HAZELNUT IGE QN: <0.1 KUA/L (ref ?–0.1)
IGE SERPL-ACNC: 86.4 KU/L (ref 2–214)
PEANUT IGE QN: <0.1 KUA/L (ref ?–0.1)
POTATO IGE QN: <0.1 KUA/L (ref ?–0.1)
SALMON IGE QN: <0.1 KUA/L (ref ?–0.1)
SCALLOP IGE QN: 0.55 KUA/L (ref ?–0.1)
SESAME SEED IGE QN: <0.1 KUA/L (ref ?–0.1)
SHRIMP IGE QN: 0.79 KUA/L (ref ?–0.1)
SOYBEAN IGE QN: <0.1 KUA/L (ref ?–0.1)
TOMATO IGE QN: <0.1 KUA/L (ref ?–0.1)
WALNUT IGE QN: <0.1 KUA/L (ref ?–0.1)
WHEAT IGE QN: <0.1 KUA/L (ref ?–0.1)

## 2025-01-21 LAB
C1 EST INHIB FUNC: 109 %MEAN NORMAL
C1 ESTERASE INHIB: 30 MG/DL
C4 COMPLEMENT: 25 MG/DL
TRYPTASE: 4.6 UG/L

## 2025-01-22 LAB
ALLERGEN, FOOD, RICE: <0.1 KU/L
ALLERGEN, FOOD, RICE: <0.1 KU/L

## 2025-02-06 ENCOUNTER — OFFICE VISIT (OUTPATIENT)
Facility: CLINIC | Age: 40
End: 2025-02-06
Payer: COMMERCIAL

## 2025-02-06 VITALS
SYSTOLIC BLOOD PRESSURE: 110 MMHG | OXYGEN SATURATION: 99 % | RESPIRATION RATE: 14 BRPM | HEART RATE: 62 BPM | DIASTOLIC BLOOD PRESSURE: 80 MMHG

## 2025-02-06 DIAGNOSIS — Z91.013 SHELLFISH ALLERGY: ICD-10-CM

## 2025-02-06 DIAGNOSIS — T78.2XXD ANAPHYLAXIS, SUBSEQUENT ENCOUNTER: ICD-10-CM

## 2025-02-06 DIAGNOSIS — Z91.013 ALLERGY TO MOLLUSK: ICD-10-CM

## 2025-02-06 DIAGNOSIS — J38.5 LARYNGOSPASM: Primary | ICD-10-CM

## 2025-02-06 DIAGNOSIS — K22.4 ESOPHAGEAL SPASM: ICD-10-CM

## 2025-02-06 PROCEDURE — 3074F SYST BP LT 130 MM HG: CPT | Performed by: ALLERGY & IMMUNOLOGY

## 2025-02-06 PROCEDURE — 3079F DIAST BP 80-89 MM HG: CPT | Performed by: ALLERGY & IMMUNOLOGY

## 2025-02-06 PROCEDURE — 99214 OFFICE O/P EST MOD 30 MIN: CPT | Performed by: ALLERGY & IMMUNOLOGY

## 2025-02-06 NOTE — PATIENT INSTRUCTIONS
Patient will avoid shellfish: Shrimp, lobster, crab.    Patient will also avoid mollusks: Clams, oysters, mussels, scallops, squid-calamari,    Patient will consider consultation with gastroenterologist for evaluation of possible esophageal spasm-suggest Dr. Kaushik Lozoya[facility] in [place].    Patient will consider consultation with academic ENT specialist for evaluation of possible laryngeal spasm-suggest Dr. Smith, Dr. Guillaume, or Dr. Suárez-[facility].    Patient will follow-up in my [place] allergy clinic in [facility] as needed.   <-- Click to add NO pertinent Past Medical History

## 2025-02-06 NOTE — PROGRESS NOTES
Anthony Troy is a 39 year old female.    HPI:     Chief Complaint   Patient presents with    Allergies     Patient presents today for 3 week follow-up for potential food allergies and to review lab test results.      Patient is a 39-year-old female for a lengthy, complex follow-up.  Patient was initially referred by Dr. Moriah Washington[facility] ENT specialist for evaluation of possible anaphylaxis/laryngeal spasm/esophageal spasm.    Today we reviewed exhaustive lab results.  Patient's serum tryptase was normal; her C4 was normal; her C1 esterase inhibitor quantitative and functional was normal; she had no thyroglobulin or thyroid peroxidase antibodies.  Patient was negative for HIV screen; she was negative for acute hepatitis B or C.    However patient did reveal RAST positive allergy to shellfish and mollusks-though she has had symptoms unrelated to eating those foods.        HISTORY:  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 carrier      Past Surgical History:   Procedure Laterality Date          Needle biopsy right       benign u/s guided bx-fibroadenoma    [place] teeth removed        Family History   Problem Relation Age of Onset    Diabetes Father     Lipids Father     Cancer Mother         non hodgkin lymphoma dx age 60s    Lipids Mother     No Known Problems Son     Stroke Maternal Grandmother     No Known Problems Maternal Grandfather     No Known Problems Paternal Grandmother     No Known Problems Paternal Grandfather     Asthma Brother     Cancer Brother 35        esophageal and liver cancer    No Known Problems Sister     No Known Problems Sister     No Known Problems Sister     Heart Attack Neg     Prostate Cancer Neg     Breast Cancer Neg     Ovarian Cancer  Neg     Uterine Cancer Neg     Pancreatic Cancer Neg     Colon Cancer Neg       Social History:   Social History     Socioeconomic History    Marital status:     Number of children: 1    Years of education: 16   Tobacco Use    Smoking status: Former     Current packs/day: 0.00     Types: Cigarettes     Start date: 3/2/2006     Quit date: 3/2/2014     Years since quitting: 10.9     Passive exposure: Never    Smokeless tobacco: Never   Vaping Use    Vaping status: Never Used   Substance and Sexual Activity    Alcohol use: Not Currently     Alcohol/week: 4.0 standard drinks of alcohol    Drug use: Not Currently     Comment: last used in end of feb/march    Sexual activity: Yes     Partners: Male     Birth control/protection: Paragard     Comment: paragard 10/12/23   Other Topics Concern    Caffeine Concern No    Stress Concern No    Weight Concern No    Special Diet No    Exercise Yes    Seat Belt Yes    Self-Exams Yes        Medications (Active prior to today's visit):  Current Outpatient Medications   Medication Sig Dispense Refill    dexamethasone 0.1 % Ophthalmic Suspension Place 2 drops into the left eye 2 (two) times daily. 5 mL 0    hydrocortisone 2.5 % External Cream       valACYclovir 500 MG Oral Tab Take 1 tablet (500 mg total) by mouth daily. 90 tablet 1    Rizatriptan Benzoate 5 MG Oral Tab Take 1 tablet (5 mg total) by mouth as needed for Migraine. 30 tablet 0       Allergies:  Allergies[1]      ROS:   Review of Systems   Constitutional:  Negative for activity change, appetite change, chills, diaphoresis, fatigue, fever and unexpected weight change.   HENT:  Positive for trouble swallowing. Negative for congestion, ear discharge, ear pain, facial swelling, hearing loss, mouth sores, postnasal drip, rhinorrhea, sinus pressure, sinus pain, sneezing, sore throat, tinnitus and voice change.    Eyes:  Negative for pain, discharge, redness and itching.   Respiratory:  Positive for shortness of breath.  Negative for apnea, cough, choking, chest tightness, wheezing and stridor.    Cardiovascular:  Negative for chest pain and palpitations.   Gastrointestinal:  Negative for abdominal distention, abdominal pain, constipation, diarrhea, nausea and vomiting.   Endocrine: Negative for cold intolerance and heat intolerance.   Musculoskeletal:  Negative for arthralgias, joint swelling and myalgias.   Skin:  Negative for pallor and rash.   Allergic/Immunologic: Positive for food allergies. Negative for environmental allergies and immunocompromised state.   Neurological:  Negative for headaches.   Psychiatric/Behavioral:  Negative for behavioral problems and sleep disturbance.           PHYSICAL EXAM:   Physical Exam  Constitutional:       Appearance: She is normal weight.   HENT:      Head: No right periorbital erythema or left periorbital erythema.      Right Ear: Tympanic membrane normal. There is no impacted cerumen.      Left Ear: Tympanic membrane normal. There is no impacted cerumen.      Nose: No congestion or rhinorrhea.      Mouth/Throat:      Pharynx: No oropharyngeal exudate or posterior oropharyngeal erythema.   Eyes:      General: Lids are normal. No allergic shiner.        Right eye: No discharge.         Left eye: No discharge.      Conjunctiva/sclera: Conjunctivae normal.      Right eye: Right conjunctiva is not injected. No exudate.     Left eye: Left conjunctiva is not injected. No exudate.  Cardiovascular:      Rate and Rhythm: Normal rate and regular rhythm.      Heart sounds: Normal heart sounds.   Pulmonary:      Effort: No tachypnea, prolonged expiration or respiratory distress.      Breath sounds: No stridor or decreased air movement. No decreased breath sounds, wheezing, rhonchi or rales.   Skin:     Coloration: Skin is not cyanotic or pale.      Findings: No acne, ecchymosis, erythema, petechiae or rash. Rash is not macular, nodular, papular, purpuric, pustular, scaling, urticarial or vesicular.            ASSESSMENT   Assessment   Encounter Diagnoses   Name Primary?    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    Patient will consider consultation with gastroenterology specialist for evaluation of possible esophageal spasm.    Patient will consider consultation with academic ENT specialist for evaluation of possible laryngeal spasm.    Patient will follow-up in my [place] allergy clinic in [facility] as needed       Orders This Visit:  No orders of the defined types were placed in this encounter.      Meds This Visit:  Requested Prescriptions      No prescriptions requested or ordered in this encounter       Imaging & Referrals:  None     2/6/2025  Pasha Sneed MD      If medication samples were provided today, they were provided solely for patient education and training related to self administration of these medications.  Teaching, instruction and sample was provided to the patient by myself.  Teaching included  a review of potential adverse side effects as well as potential efficacy.  Patient's questions were answered in regards to medication administration and dosing and potential side effects. Teaching was provided via the teach back method       [1]   Allergies  Allergen Reactions    Benzalkonium Chloride HIVES, ITCHING, PAIN and SWELLING    Neosporin Wound Cleanser HIVES, ITCHING, PAIN and SWELLING    Neosporin [Bacitracin-Polymyxin B] HIVES    Dander ITCHING

## 2025-04-15 ENCOUNTER — E-VISIT (OUTPATIENT)
Dept: TELEHEALTH | Age: 40
End: 2025-04-15
Payer: COMMERCIAL

## 2025-04-15 DIAGNOSIS — H10.30 ACUTE INFECTIVE CONJUNCTIVITIS: Primary | ICD-10-CM

## 2025-04-16 PROCEDURE — 99421 OL DIG E/M SVC 5-10 MIN: CPT | Performed by: PHYSICIAN ASSISTANT

## 2025-04-16 RX ORDER — MOXIFLOXACIN 5 MG/ML
1 SOLUTION/ DROPS OPHTHALMIC 3 TIMES DAILY
Qty: 3 ML | Refills: 0 | Status: SHIPPED | OUTPATIENT
Start: 2025-04-16 | End: 2025-04-23

## 2025-04-16 NOTE — PROGRESS NOTES
Anthony Troy is a 39 year old female.  HPI:   See answers to questions above.     Current Medications[1]   Past Medical History[2]   Past Surgical History[3]   Family History[4]   Social History:  Short Social Hx on File[5]      ASSESSMENT AND PLAN:     Encounter Diagnosis   Name Primary?    Acute infective conjunctivitis Yes           Meds & Refills for this Visit:  Requested Prescriptions     Signed Prescriptions Disp Refills    moxifloxacin 0.5 % Ophthalmic Solution 3 mL 0     Sig: Place 1 drop into the left eye 3 (three) times daily for 7 days.       Duration of  the service:  6 minutes  Total duration with all providers: 10 minutes    Patient advised to follow up with PCP if no improvement or worsening of symptoms  Refer to Qiro message for specific patient instructions                   [1]   Current Outpatient Medications   Medication Sig Dispense Refill    moxifloxacin 0.5 % Ophthalmic Solution Place 1 drop into the left eye 3 (three) times daily for 7 days. 3 mL 0    dexamethasone 0.1 % Ophthalmic Suspension Place 2 drops into the left eye 2 (two) times daily. 5 mL 0    hydrocortisone 2.5 % External Cream       valACYclovir 500 MG Oral Tab Take 1 tablet (500 mg total) by mouth daily. 90 tablet 1    Rizatriptan Benzoate 5 MG Oral Tab Take 1 tablet (5 mg total) by mouth as needed for Migraine. 30 tablet 0   [2]   Past Medical History:   Allergic rhinitis    Anemia    Anxiety    Depression    no meds at this time    Migraine without aura    Screening for genetic disease carrier status    FH-related conditions carrier, 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 deficiency carrier   [3]   Past Surgical History:  Procedure Laterality Date          Needle biopsy right       benign u/s guided bx-fibroadenoma    [place] teeth removed     [4]   Family History  Problem Relation Age of Onset    Diabetes Father     Lipids Father     Cancer Mother         non hodgkin lymphoma dx age 60s     Lipids Mother     No Known Problems Son     Stroke Maternal Grandmother     No Known Problems Maternal Grandfather     No Known Problems Paternal Grandmother     No Known Problems Paternal Grandfather     Asthma Brother     Cancer Brother 35        esophageal and liver cancer    No Known Problems Sister     No Known Problems Sister     No Known Problems Sister     Heart Attack Neg     Prostate Cancer Neg     Breast Cancer Neg     Ovarian Cancer Neg     Uterine Cancer Neg     Pancreatic Cancer Neg     Colon Cancer Neg    [5]   Social History  Socioeconomic History    Marital status:     Number of children: 1    Years of education: 16   Tobacco Use    Smoking status: Former     Current packs/day: 0.00     Types: Cigarettes     Start date: 3/2/2006     Quit date: 3/2/2014     Years since quittin.1     Passive exposure: Never    Smokeless tobacco: Never   Vaping Use    Vaping status: Never Used   Substance and Sexual Activity    Alcohol use: Not Currently     Alcohol/week: 4.0 standard drinks of alcohol    Drug use: Not Currently     Comment: last used in end of     Sexual activity: Yes     Partners: Male     Birth control/protection: Paragard     Comment: paragard 10/12/23   Other Topics Concern    Caffeine Concern No    Stress Concern No    Weight Concern No    Special Diet No    Exercise Yes    Seat Belt Yes    Self-Exams Yes

## 2025-04-16 NOTE — PROGRESS NOTES
Patient with left eye redness, discharge, itching. Follow up questions and request for photos of eye not received by end of this provider's shift.      Time Spent by this provider: 4 minutes

## 2025-04-28 NOTE — ANESTHESIA PROCEDURE NOTES
Labor Analgesia  Performed by: Laura Boo MD  Authorized by: Laura Boo MD       General Information and Staff    Start Time:  10/31/2021 8:15 AM  End Time:  10/31/2021 8:37 AM  Anesthesiologist:  Laura Boo MD  Performed by:   Anesthes Denies

## 2025-05-27 RX ORDER — RIZATRIPTAN BENZOATE 5 MG/1
5 TABLET ORAL AS NEEDED
Qty: 30 TABLET | Refills: 0 | Status: SHIPPED | OUTPATIENT
Start: 2025-05-27

## 2025-06-10 ENCOUNTER — OFFICE VISIT (OUTPATIENT)
Facility: CLINIC | Age: 40
End: 2025-06-10
Payer: COMMERCIAL

## 2025-06-10 VITALS — WEIGHT: 126 LBS | HEIGHT: 64 IN | BODY MASS INDEX: 21.51 KG/M2

## 2025-06-10 DIAGNOSIS — M79.89 SOFT TISSUE MASS: ICD-10-CM

## 2025-06-10 DIAGNOSIS — M71.22 BAKER'S CYST OF KNEE, LEFT: ICD-10-CM

## 2025-06-10 DIAGNOSIS — M23.92 KNEE INTERNAL DERANGEMENT, LEFT: ICD-10-CM

## 2025-06-10 DIAGNOSIS — M25.862 CYST OF LEFT KNEE JOINT: Primary | ICD-10-CM

## 2025-06-10 PROCEDURE — 99214 OFFICE O/P EST MOD 30 MIN: CPT | Performed by: FAMILY MEDICINE

## 2025-06-10 PROCEDURE — 3008F BODY MASS INDEX DOCD: CPT | Performed by: FAMILY MEDICINE

## 2025-06-10 PROCEDURE — 20611 DRAIN/INJ JOINT/BURSA W/US: CPT | Performed by: FAMILY MEDICINE

## 2025-06-10 RX ORDER — TRIAMCINOLONE ACETONIDE 40 MG/ML
40 INJECTION, SUSPENSION INTRA-ARTICULAR; INTRAMUSCULAR ONCE
Status: COMPLETED | OUTPATIENT
Start: 2025-06-10 | End: 2025-06-10

## 2025-06-10 RX ADMIN — TRIAMCINOLONE ACETONIDE 40 MG: 40 INJECTION, SUSPENSION INTRA-ARTICULAR; INTRAMUSCULAR at 08:06:00

## 2025-06-10 NOTE — PROGRESS NOTES
Sports Medicine Clinic Note    Subjective:    Chief Complaint: Swelling in posterior left knee    History: 40-year-old female returns for follow-up of persistent swelling in the posterior left knee. She previously underwent aspiration and corticosteroid injection of a popliteal cyst under ultrasound guidance about 6 months, which was well tolerated. She reports that the swelling and associated discomfort initially improved but have since recurred to a lesser degree. No new injury or trauma reported. She again expresses interest in aspiration and injection, which provided relief previously.    Objective:    Left Knee Examination:    Inspection: No erythema, ecchymoses, wounds, or effusion. Neutral alignment, no visible atrophy.  Palpation: Palpable cyst in the popliteal fossa. Non-tender medial and lateral joint lines, patella, IT band, pes anserinus, patellar tendon, and quadriceps tendon.  Range of Motion: 0-130 degrees without flexion contracture or quad lag. No pain or crepitus with motion.  Stability: Stable to anterior/posterior and medial/lateral stress at 0 and 30 degrees of flexion with firm endpoints.  Strength: 5/5 in SLR, tibialis anterior, gastrocnemius-soleus, flexor hallucis longus, and extensor hallucis longus.  Neurovascular: Sensation intact to SPN, DPN, saphenous, sural, and tibial nerve distributions. Extremity warm and well-perfused.    Diagnostic Tests:    Ultrasound of the left knee reviewed and demonstrates a cystic lesion in the posterior medial left knee measuring up to 4.2 x 2.0 x 3.6 cm, compatible with a Baker's cyst.    Previous weight-bearing X-rays of the left knee demonstrated no fractures, dislocations, or degenerative changes.    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    Plan:    Additional Workup: MRI re-ordered to identify underlying internal derangement leading to cyst formation. Repeated aspirations are  not an ideal long term management plan.  Procedures: Repeat aspiration and corticosteroid injection of the popliteal cyst performed today under sterile technique and ultrasound guidance. Patient tolerated the procedure without complications.   Medications: NSAIDs or Tylenol as needed for discomfort.  Activity Recommendations: Avoid strenuous activity for 24 to 48 hours. May resume normal activities as tolerated thereafter. Use ice for swelling and discomfort as needed.  Post-Injection Care: Patient instructed to monitor for increased swelling, fever, persistent pain, or signs of infection. Advised to call immediately if concerning symptoms occur.    Follow-Up: Tentatively scheduled in 2-4 weeks to reassess response to the intervention. Patient instructed to return earlier if swelling or discomfort persists or worsens.    - - -    Ultrasound Guided Procedure Note:    After discussion of the risks and benefits, the patient elected to proceed with an aspiration/injection of a left popliteal cyst under US guidance. Confirmed that the patient does not have history of prior adverse reactions, active infections, or relevant allergies. There was no erythema or warmth, and the skin was clear.    The skin was sterilized with ChloraPrep. An 18 gauge needle was inserted via inferolateral approach utilizing US for needle guidance and placement after local anesthesia was achieved with 2 mL of 1% Lidocaine without Epinephrine. The aspiration was completed without complication, yielding 15 mL of straw colored fluid. Following aspiration the syringe was exchanged and the site was injected with a mixture of 1 mL of triamcinolone 40 mg/mL and 2 mL of 1% Lidocaine without Epinephrine. The injection was completed without complication, and a bandage was applied.     Post-Injection Care: The patient tolerated the procedure well. An occlusive bandage was placed over the injection site. Post-injection care instructions provided to the  patient. The patient was asked to avoid strenuous activity and continue to rest the area for 24 to 48 hours before resuming regular activities. Patient advised that the area may be more painful for the first 1-2 days. They can use ice or Tylenol for pain as needed.  Patient was instructed to watch for fever, increased swelling, or persistent pain. The patient will call immediately with any signs of infection or allergic reaction.      Bridger Feliciano DO, CAQSM   Primary Care Sports Medicine

## 2025-06-23 ENCOUNTER — OFFICE VISIT (OUTPATIENT)
Dept: FAMILY MEDICINE CLINIC | Facility: CLINIC | Age: 40
End: 2025-06-23
Payer: COMMERCIAL

## 2025-06-23 ENCOUNTER — TELEPHONE (OUTPATIENT)
Dept: FAMILY MEDICINE CLINIC | Facility: CLINIC | Age: 40
End: 2025-06-23

## 2025-06-23 VITALS
OXYGEN SATURATION: 99 % | DIASTOLIC BLOOD PRESSURE: 68 MMHG | WEIGHT: 129 LBS | SYSTOLIC BLOOD PRESSURE: 110 MMHG | RESPIRATION RATE: 16 BRPM | BODY MASS INDEX: 22 KG/M2 | HEART RATE: 93 BPM

## 2025-06-23 DIAGNOSIS — G43.009 MIGRAINE WITHOUT AURA AND WITHOUT STATUS MIGRAINOSUS, NOT INTRACTABLE: Primary | ICD-10-CM

## 2025-06-23 DIAGNOSIS — Z00.00 ROUTINE HEALTH MAINTENANCE: ICD-10-CM

## 2025-06-23 DIAGNOSIS — N92.4 EXCESSIVE BLEEDING IN PREMENOPAUSAL PERIOD: ICD-10-CM

## 2025-06-23 DIAGNOSIS — R55 SYNCOPE, UNSPECIFIED SYNCOPE TYPE: ICD-10-CM

## 2025-06-23 PROCEDURE — 99214 OFFICE O/P EST MOD 30 MIN: CPT | Performed by: FAMILY MEDICINE

## 2025-06-23 PROCEDURE — 3078F DIAST BP <80 MM HG: CPT | Performed by: FAMILY MEDICINE

## 2025-06-23 PROCEDURE — 3074F SYST BP LT 130 MM HG: CPT | Performed by: FAMILY MEDICINE

## 2025-06-23 RX ORDER — RIZATRIPTAN BENZOATE 10 MG/1
10 TABLET ORAL AS NEEDED
Qty: 12 TABLET | Refills: 5 | Status: SHIPPED | OUTPATIENT
Start: 2025-06-23

## 2025-06-23 RX ORDER — ONDANSETRON 4 MG/1
4 TABLET, ORALLY DISINTEGRATING ORAL EVERY 8 HOURS PRN
Qty: 20 TABLET | Refills: 0 | Status: SHIPPED | OUTPATIENT
Start: 2025-06-23

## 2025-06-23 NOTE — PROGRESS NOTES
The following individual(s) verbally consented to be recorded using ambient AI listening technology and understand that they can each withdraw their consent to this listening technology at any point by asking the clinician to turn off or pause the recording:    Patient name: Anthony Troy

## 2025-06-23 NOTE — PROGRESS NOTES
Chief Complaint:  Chief Complaint   Patient presents with    Headache     On going one year during cycle     Menstrual Problem     Heavy cycle leaking         Dizziness     Dizzy and lightheaded - passed out this last weekend        HPI:  This is a 40 year old female patient presenting for Headache (On going one year during cycle ), Menstrual Problem (Heavy cycle leaking //), and Dizziness (Dizzy and lightheaded - passed out this last weekend )    History of Present Illness  Anthony Troy is a 40 year old female who presents with uncontrollable migraines and recent syncope. She is accompanied by her three and a half year old child.    She experiences uncontrollable migraines, with the worst episode occurring last month, causing significant time off work. The pain is severe, occurring daily for two weeks, and leads to vomiting at work. She ran out of rizatriptan during this period, which compounded the issue due to a delay in getting a refill. Rizatriptan takes about 30 minutes to work and can relieve the headache for 8 hours, but sometimes she needs to retake it every 4 to 5 hours. On the worst weeks, she takes 2 to 3 doses a day, primarily during the two weeks surrounding her period. Her periods have become more frequent, occurring every 3 to 4 weeks, which exacerbates her migraines.    She reports a recent episode of syncope while visiting a friend. She felt faint, dizzy, and hot before losing her eyesight and passing out. She regained consciousness quickly and has not experienced such an episode in a long time. She mentions feeling dizzy throughout her life but notes that it has been manageable until now. She also reports feeling lethargic and lacking energy, impacting her ability to perform household chores. No chest pain during the syncope episode.    Regarding her menstrual history, she has been using a copper IUD since October 2023 to avoid hormones. Since then, she has experienced heavier, longer  periods with more cramping, which have worsened her migraines. Last month, she had an exceptionally heavy period, soaking through her pad, clothes, and bedding, which she describes as unprecedented. She has tried hormonal birth control in the past, including pills and the NuvaRing, but does not recall any significant impact on her migraines.    She currently takes fiber supplements and a gummy vitamin. She has not been taking any other supplements or medications for her symptoms.    Health Maintenance:  Health Maintenance   Topic Date Due    COVID-19 Vaccine (6 - 2024-25 season) 09/01/2024    Annual Depression Screening  01/01/2025    Annual Physical  02/06/2025    Pap Smear  03/14/2025    Mammogram  04/26/2025    DTaP,Tdap,and Td Vaccines (3 - Td or Tdap) 08/23/2031    Influenza Vaccine  Completed    Pneumococcal Vaccine: Birth to 50yrs  Aged Out    Meningococcal B Vaccine  Aged Out       ROS: Review of systems performed and negative unless stated in HPI.    Past medical, family and social history reviewed and listed as below.    HISTORY:  Past Medical History[1]   Past Surgical History[2]   Family History[3]   Short Social Hx on File[4]     Current Medications[5]  Allergies:  Allergies[6]    EXAM:  Vitals:    06/23/25 0754   BP: 110/68   Pulse: 93   Resp: 16   SpO2: 99%   Weight: 129 lb (58.5 kg)     GENERAL: vitals reviewed and listed above, alert, oriented, appears well hydrated and in no acute distress  HEENT: atraumatic, conjunctiva clear, no obvious abnormalities on inspection   LUNGS: clear to auscultation bilaterally, no wheezes, rales or rhonchi, good air movement  CV: HRRR, no murmurs, no peripheral edema   EXTREMITIES: warm and well perfused  PSYCH: pleasant and cooperative, no obvious depression or anxiety    ASSESSMENT AND PLAN:  Discussed the following assessment   1. Migraine without aura and without status migrainosus, not intractable (Primary)  -     Rizatriptan Benzoate; Take 1 tablet (10 mg  total) by mouth as needed for Migraine (No more than 2 doses in 24 hours).  Dispense: 12 tablet; Refill: 5  -     Ondansetron; Take 1 tablet (4 mg total) by mouth every 8 (eight) hours as needed.  Dispense: 20 tablet; Refill: 0  2. Routine health maintenance  -     CBC With Differential With Platelet; Future; Expected date: 06/23/2025  -     Comp Metabolic Panel (14); Future; Expected date: 06/23/2025  -     Lipid Panel; Future; Expected date: 06/23/2025  -     TSH W Reflex To Free T4; Future; Expected date: 06/23/2025  -     Hemoglobin A1C; Future; Expected date: 06/23/2025  3. Syncope, unspecified syncope type  -     Ferritin; Future; Expected date: 06/23/2025  -     Iron And Tibc; Future; Expected date: 06/23/2025  -     EKG 12 Lead; Future; Expected date: 06/23/2025  4. Excessive bleeding in premenopausal period  -     CBC With Differential With Platelet; Future; Expected date: 06/23/2025  -     Ferritin; Future; Expected date: 06/23/2025  -     Iron And Tibc; Future; Expected date: 06/23/2025      Advised the following:  Assessment & Plan  Migraine without aura  Chronic migraines are uncontrolled, with severe daily episodes for two weeks last month, causing significant functional impairment and nausea. Current rizatriptan 5 mg is insufficient, requiring frequent dosing without consistent relief. Migraines are exacerbated by hormonal fluctuations due to the copper IUD, which has increased menstrual frequency and intensity. She is willing to switch to a hormonal IUD, such as Mirena, to reduce menstrual frequency and intensity, potentially alleviating migraines. Increasing rizatriptan to 10 mg may provide more effective relief, though it limits dosing to twice in 24 hours.  - Increase rizatriptan to 10 mg, to be taken twice in 24 hours as needed  - Schedule replacement of copper IUD with a hormonal IUD (Mirena) to reduce menstrual frequency and intensity, potentially alleviating migraines  - Recommend starting  magnesium glycinate supplement to potentially decrease migraine frequency  - Prescribe anti-nausea medication for use during migraine episodes  - Consider prophylactic migraine medication if hormonal IUD does not reduce migraine frequency and severity    Menorrhagia  Menstrual periods have become heavier and more frequent since the copper IUD insertion in October 2023, contributing to increased migraine frequency and severity. She reports significant blood loss during periods, raising concerns about potential anemia. Switching to a hormonal IUD is expected to slow down periods and make them lighter, potentially reducing migraine frequency and severity.  - Schedule replacement of copper IUD with a hormonal IUD (Mirena) to reduce menstrual bleeding and frequency  - Order blood work to assess for anemia and other potential causes of syncope    Syncope  Recent syncope episode occurred with faintness, dizziness, and feeling hot, likely due to vasovagal response exacerbated by potential anemia from heavy menstrual bleeding. Blood pressure is on the lower side of normal, and dehydration may have contributed. An EKG is considered to rule out cardiac causes, though symptoms align with vasovagal syncope.  - Order blood work to assess for anemia  - Advise increasing fluid intake and adding salt to diet to help maintain blood pressure  - Order EKG at [facility] to rule out cardiac causes of syncope    General Health Maintenance  Routine health maintenance is due, including annual lab work to monitor overall health status.  - Order routine labs including electrolytes, kidney and liver function tests, cholesterol, and blood counts    Follow-up  Follow-up is necessary to monitor the effectiveness of the new treatment plan and address ongoing issues. She prefers IUD replacement here rather than at another facility.  - Schedule follow-up appointment for IUD replacement on July 1st  - Advise follow-up if experiencing increased  lightheadedness, dizziness, or chest pain    Recording duration: 24 minutes    Concerning signs and symptoms that warrant returning to the clinic reviewed and patient demonstrated understanding.    Argelia Mccormack DO  2025 8:10 AM  Family Medicine    Note to Patient  The 21st Century Cures Act makes medical notes like these available to patients in the interest of transparency. However, be advised this is a medical document and is intended as [id number] communication; it is written in medical language and may appear blunt, direct, or contain abbreviations or verbiage that are unfamiliar. Medical documents are intended to carry relevant information, facts as evident, and the clinical opinion of the practitioner.     This report has been produced using speech recognition software and [employer] technology, and may contain errors related to grammar, punctuation, spelling, words or phrases unrecognized or not translated appropriately to text; these errors may be referred to the dictating provider for further clarification and/or addendum as needed.       [1]   Past Medical History:   Allergic rhinitis    Anemia    Anxiety    Depression    no meds at this time    Migraine without aura    Screening for genetic disease carrier status    FH-related conditions carrier, 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 deficiency carrier   [2]   Past Surgical History:  Procedure Laterality Date          Needle biopsy right       benign u/s guided bx-fibroadenoma    [place] teeth removed     [3]   Family History  Problem Relation Age of Onset    Diabetes Father     Lipids Father     Cancer Mother         non hodgkin lymphoma dx age 60s    Lipids Mother     No Known Problems Son     Stroke Maternal Grandmother     No Known Problems Maternal Grandfather     No Known Problems Paternal Grandmother     No Known Problems Paternal Grandfather     Asthma Brother     Cancer Brother 35        esophageal and liver  cancer    No Known Problems Sister     No Known Problems Sister     No Known Problems Sister     Heart Attack Neg     Prostate Cancer Neg     Breast Cancer Neg     Ovarian Cancer Neg     Uterine Cancer Neg     Pancreatic Cancer Neg     Colon Cancer Neg    [4]   Social History  Socioeconomic History    Marital status:     Number of children: 1    Years of education: 16   Tobacco Use    Smoking status: Former     Current packs/day: 0.00     Types: Cigarettes     Start date: 3/2/2006     Quit date: 3/2/2014     Years since quittin.3     Passive exposure: Never    Smokeless tobacco: Never   Vaping Use    Vaping status: Never Used   Substance and Sexual Activity    Alcohol use: Not Currently     Alcohol/week: 4.0 standard drinks of alcohol    Drug use: Not Currently     Comment: last used in end of     Sexual activity: Yes     Partners: Male     Birth control/protection: Paragard     Comment: paragard 10/12/23   Other Topics Concern    Caffeine Concern No    Stress Concern No    Weight Concern No    Special Diet No    Exercise Yes    Seat Belt Yes    Self-Exams Yes   [5]   Current Outpatient Medications   Medication Sig Dispense Refill    Rizatriptan Benzoate 10 MG Oral Tab Take 1 tablet (10 mg total) by mouth as needed for Migraine (No more than 2 doses in 24 hours). 12 tablet 5    ondansetron 4 MG Oral Tablet Dispersible Take 1 tablet (4 mg total) by mouth every 8 (eight) hours as needed. 20 tablet 0    hydrocortisone 2.5 % External Cream       valACYclovir 500 MG Oral Tab Take 1 tablet (500 mg total) by mouth daily. 90 tablet 1    dexamethasone 0.1 % Ophthalmic Suspension Place 2 drops into the left eye 2 (two) times daily. (Patient not taking: Reported on 2025) 5 mL 0   [6]   Allergies  Allergen Reactions    Benzalkonium Chloride HIVES, ITCHING, PAIN and SWELLING    Neosporin Wound Cleanser HIVES, ITCHING, PAIN and SWELLING    Neosporin [Bacitracin-Polymyxin B] HIVES    Dander ITCHING

## 2025-06-23 NOTE — TELEPHONE ENCOUNTER
Accessed Avail[facility] Portal  Result    Ref: [id number]  Status  No Action Required  Reference Number  [id number]  Transaction ID  [id number]  Requested Service does not require preauthorization.    Diagnoses  Z30.430 - Encounter for insertion of intrauterine contraceptive device  Procedures Details   - Mirena 52 mg

## 2025-06-23 NOTE — TELEPHONE ENCOUNTER
PAtient would like a Mirena IUD. Scheduled on July 1st. Please order.   Thanks,  Argelia Mccormack DO

## 2025-07-01 ENCOUNTER — OFFICE VISIT (OUTPATIENT)
Dept: FAMILY MEDICINE CLINIC | Facility: CLINIC | Age: 40
End: 2025-07-01
Payer: COMMERCIAL

## 2025-07-01 VITALS
RESPIRATION RATE: 16 BRPM | OXYGEN SATURATION: 97 % | BODY MASS INDEX: 22 KG/M2 | SYSTOLIC BLOOD PRESSURE: 118 MMHG | DIASTOLIC BLOOD PRESSURE: 64 MMHG | HEART RATE: 98 BPM | WEIGHT: 128.81 LBS

## 2025-07-01 DIAGNOSIS — Z30.011 ENCOUNTER FOR INITIAL PRESCRIPTION OF CONTRACEPTIVE PILLS: ICD-10-CM

## 2025-07-01 DIAGNOSIS — Z53.8 UNSUCCESSFUL IUD INSERTION: ICD-10-CM

## 2025-07-01 DIAGNOSIS — Z12.31 ENCOUNTER FOR SCREENING MAMMOGRAM FOR MALIGNANT NEOPLASM OF BREAST: ICD-10-CM

## 2025-07-01 DIAGNOSIS — Z30.432 ENCOUNTER FOR IUD REMOVAL: Primary | ICD-10-CM

## 2025-07-01 PROCEDURE — 3078F DIAST BP <80 MM HG: CPT | Performed by: FAMILY MEDICINE

## 2025-07-01 PROCEDURE — 81025 URINE PREGNANCY TEST: CPT | Performed by: FAMILY MEDICINE

## 2025-07-01 PROCEDURE — 58301 REMOVE INTRAUTERINE DEVICE: CPT | Performed by: FAMILY MEDICINE

## 2025-07-01 PROCEDURE — 3074F SYST BP LT 130 MM HG: CPT | Performed by: FAMILY MEDICINE

## 2025-07-01 RX ORDER — LEVONORGESTREL/ETHIN.ESTRADIOL 0.1-0.02MG
1 TABLET ORAL DAILY
Qty: 28 TABLET | Refills: 12 | Status: SHIPPED | OUTPATIENT
Start: 2025-07-01 | End: 2026-07-01

## 2025-07-06 NOTE — PROGRESS NOTES
S: Pt here for IUD insertion.     O:   Vitals:    07/01/25 1157   BP: 118/64   Pulse: 98   Resp: 16       A/P:  Anthony was seen today for iud.    Diagnoses and all orders for this visit:    Encounter for IUD removal  -     Urine Preg Test    Encounter for screening mammogram for malignant neoplasm of breast    Encounter for initial prescription of contraceptive pills  -     Levonorgestrel-Ethinyl Estrad 0.1-20 MG-MCG Oral Tab; Take 1 tablet by mouth daily.    Unsuccessful IUD insertion       Risks of the insertion procedure, including but not limited to cramping, displaced threads, ectopic pregnancy, embedment or fragmentation of IUD, expulsion, infertility, pelvic infection, septicemia during pregnancy, tubo-ovarian damage, uterine or cervical perforation, vaginal bleeding, and vasovagal reaction (on insertion), discussed with patient. Discussed adverse effects related specifically to the hormone-releasing IUD,  including amenorrhea, acne, depression, weight gain, decreased libido, and headache. Informed consent obtained and time out performed. Patient was sterilely draped and prepped. Using sterile technique, sterile speculum was inserted and the cervix was cleaned x3 with Betadine. Strings of Paragard IUD located at cervical os and IUD removed intact with gentle traction. Several attempts were made to pass a uterine sound through the cervical os without success (measure to 5cm). Given patinet discomfort and inability to appropriately measure uterine length, did not attempt insertion of Mirena IUD.   Advised pt that if she has any symptoms of abdominal pain, fever, nausea/vomiting, or excessive blood loss, she is to call the office. She understands that she is to abstain from placing anything intravaginally for the next 7 days. Advised pt to make f/u appt with gynecology asap for placement of Mirena IUD. Discussed interim birth control options and patient decided in trying OCPs for now.  Pt voiced understanding  and agreement.  Argelia Mccormack DO

## 2025-07-21 ENCOUNTER — TELEPHONE (OUTPATIENT)
Dept: FAMILY MEDICINE CLINIC | Facility: CLINIC | Age: 40
End: 2025-07-21

## 2025-07-21 NOTE — TELEPHONE ENCOUNTER
Spoke with patient, states that her migraines have gotten worse and the rizatriptan helps only for a few hours. Patient states she feels \"water dripping\" in her head at times. Patient advised to visit  immediately. Patient verbalized understanding. Urgent care information sent to patient via [employer] per request.

## (undated) DEVICE — LARGE, DISPOSABLE ALEXIS O C-SECTION PROTECTOR - RETRACTOR: Brand: ALEXIS ® O C-SECTION PROTECTOR - RETRACTOR

## (undated) NOTE — LETTER
Patient Name: Everton Pelletier  : 1985  MRN: [id number]  Patient Address: [place] [address]      Coronavirus Disease 2019 (COVID-19)     [facility] is committed to the safety and well-being of our patients, members, [place] If your symptoms get worse, call your healthcare provider immediately. 3. Get rest and stay hydrated.    4. If you have a medical appointment, call the healthcare provider ahead of time and tell them that you have or may have COVID-19.  5. For medical haim fever-reducing medications; and  · Improvement in respiratory symptoms (e.g., cough, shortness of breath); and  · At least 10 days have passed since symptoms first appeared OR if asymptomatic patient or date of symptom onset is unclear then use 10 days pos donors must:    · Have had a confirmed diagnosis of COVID-19  · Be symptom-free for at least 14 days*    *Some people will be required to have a repeat COVID-19 test in order to be eligible to donate.  If you’re instructed by Ranjeet that a repeat test is r random. Researchers are trying to identify similarities between people with a Post-COVID condition to better understand if there are risk factors. How do I prevent a Post-COVID condition?   The best way to prevent the long-term symptoms of COVID-19 is

## (undated) NOTE — LETTER
12/13/21        To Whom This May Concern:      Please be advised Srikanth is a patient under my care for her recent pregnancy. She had a Ceasarian section to deliver her baby and she will need an additional two (2) weeks to recover.   [facility] should be a

## (undated) NOTE — LETTER
21      To Whom This May Concern;    Please be advised Srikanth is a patient under my care. She was pregnant and delivered her baby via . Due to these circumstances she will require an additional two weeks to recover.   Her return to wor